# Patient Record
Sex: FEMALE | Race: WHITE | Employment: UNEMPLOYED | URBAN - METROPOLITAN AREA
[De-identification: names, ages, dates, MRNs, and addresses within clinical notes are randomized per-mention and may not be internally consistent; named-entity substitution may affect disease eponyms.]

---

## 2017-02-28 ENCOUNTER — TELEPHONE (OUTPATIENT)
Dept: FAMILY MEDICINE CLINIC | Age: 47
End: 2017-02-28

## 2017-02-28 NOTE — TELEPHONE ENCOUNTER
Olya referral approved   Liban Neal # [id number]  Patient aware of approval   Faxed over to Dr. Vika Wallace office

## 2017-02-28 NOTE — TELEPHONE ENCOUNTER
Pt is currently seen by her Oncologist (Dr Tessie Wills) and has not been seen here since 4/26/2016 with Dr Grace Sheikh. She needs to see an ophthalmologist and can get an appt tomorrow but she needs a referral. Please call and let her know if this can be done. Patient is requesting (New  Updated) referral to the following office:    Speciality: ophthalmology  Specialist Name: Dimple Salazar  Office Location: [address] [place] [address], [address] [place] [address]   Phone (if available): [phone]  Fax (if available): [phone]  Diagnosis: flashes of light in preful vision  Date of appointment (if scheduled): 3/1/2017 [facility]

## 2017-04-24 ENCOUNTER — HOSPITAL ENCOUNTER (OUTPATIENT)
Dept: CT IMAGING | Age: 47
Discharge: HOME | End: 2017-04-24
Attending: OBSTETRICS & GYNECOLOGY
Payer: COMMERCIAL

## 2017-04-24 DIAGNOSIS — C56.1 MALIGNANT NEOPLASM OF RIGHT OVARY (HCC): ICD-10-CM

## 2017-04-24 PROCEDURE — 74011636320 HC RX REV CODE- 636/320: Performed by: OBSTETRICS & GYNECOLOGY

## 2017-04-24 PROCEDURE — 74177 CT ABD & PELVIS W/CONTRAST: CPT

## 2017-04-24 RX ADMIN — IOPAMIDOL 100 ML: 612 INJECTION, SOLUTION INTRAVENOUS at 09:00

## 2017-06-19 ENCOUNTER — OFFICE VISIT (OUTPATIENT)
Dept: FAMILY MEDICINE CLINIC | Age: 47
End: 2017-06-19

## 2017-06-19 VITALS
SYSTOLIC BLOOD PRESSURE: 119 MMHG | BODY MASS INDEX: 22.98 KG/M2 | DIASTOLIC BLOOD PRESSURE: 79 MMHG | OXYGEN SATURATION: 99 % | WEIGHT: 146.4 LBS | TEMPERATURE: 96.5 F | HEIGHT: 67 IN | HEART RATE: 55 BPM | RESPIRATION RATE: 18 BRPM

## 2017-06-19 DIAGNOSIS — R53.83 MALAISE AND FATIGUE: Primary | ICD-10-CM

## 2017-06-19 DIAGNOSIS — R93.89 ABNORMAL RADIOLOGICAL FINDINGS IN SKIN AND SUBCUTANEOUS TISSUE: ICD-10-CM

## 2017-06-19 DIAGNOSIS — G89.29 OTHER CHRONIC PAIN: ICD-10-CM
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
## 2017-06-19 DIAGNOSIS — F39 MOOD DISORDER (HCC): ICD-10-CM

## 2017-06-19 DIAGNOSIS — C56.9 OVARIAN CARCINOMA, UNSPECIFIED LATERALITY (HCC): ICD-10-CM

## 2017-06-19 DIAGNOSIS — R53.81 MALAISE AND FATIGUE: Primary | ICD-10-CM

## 2017-06-19 RX ORDER — PAROXETINE 10 MG/1
TABLET, FILM COATED ORAL DAILY
COMMUNITY
End: 2017-06-19

## 2017-06-19 RX ORDER — PAROXETINE HYDROCHLORIDE 20 MG/1
20 TABLET, FILM COATED ORAL DAILY
Qty: 30 TAB | Refills: 1 | Status: SHIPPED | OUTPATIENT
Start: 2017-06-19 | End: 2017-09-02 | Stop reason: SDUPTHER

## 2017-06-19 RX ORDER — DICLOFENAC SODIUM 50 MG/1
50 TABLET, DELAYED RELEASE ORAL
Qty: 60 TAB | Refills: 1 | Status: SHIPPED | OUTPATIENT
Start: 2017-06-19 | End: 2017-08-22

## 2017-06-19 NOTE — PATIENT INSTRUCTIONS
Fatigue: Care Instructions  Your Care Instructions  Fatigue is a feeling of tiredness, exhaustion, or lack of energy. You may feel fatigue because of too much or not enough activity. It can also come from stress, lack of sleep, boredom, and poor diet. Many medical problems, such as viral infections, can cause fatigue. Emotional problems, especially depression, are often the cause of fatigue. Fatigue is most often a symptom of another problem. Treatment for fatigue depends on the cause. For example, if you have fatigue because you have a certain health problem, treating this problem also treats your fatigue. If depression or anxiety is the cause, treatment may help. Follow-up care is a key part of your treatment and safety. Be sure to make and go to all appointments, and call your doctor if you are having problems. It's also a good idea to know your test results and keep a list of the medicines you take. How can you care for yourself at home? · Get regular exercise. But don't overdo it. Go back and forth between rest and exercise. · Get plenty of rest.  · Eat a healthy diet. Do not skip meals, especially breakfast.  · Reduce your use of caffeine, tobacco, and alcohol. Caffeine is most often found in coffee, tea, cola drinks, and chocolate. · Limit medicines that can cause fatigue. This includes tranquilizers and cold and allergy medicines. When should you call for help? Watch closely for changes in your health, and be sure to contact your doctor if:  · You have new symptoms such as fever or a rash. · Your fatigue gets worse. · You have been feeling down, depressed, or hopeless. Or you may have lost interest in things that you usually enjoy. · You are not getting better as expected. Where can you learn more? Go to http://alexander-celena.info/. Enter Z454 in the search box to learn more about \"Fatigue: Care Instructions. \"  Current as of: May 27, 2016  Content Version: 11.2  © 8086-4667 Healthwise, [facility]. Care instructions adapted under license by [employer] (which disclaims liability or warranty for this information). If you have questions about a medical condition or this instruction, always ask your healthcare professional. [address] any warranty or liability for your use of this information. Learning About Mood Disorders  What are mood disorders? Mood disorders are medical problems that affect how you feel. They can impact your moods, thoughts, and actions. Mood disorders include:  · Depression. This causes you to feel sad and hopeless for much of the time. · Bipolar disorder. This causes extreme mood changes from manic episodes of very high energy to extreme lows of depression. · Seasonal affective disorder (SAD). This is a type of depression that affects you during the same season each year. Most often people experience SAD during the fall and winter months when days are shorter and there is less light. What are the symptoms? Depression  You may:  · Feel sad or hopeless nearly every day. · Lose interest in or not get pleasure from most daily activities. You feel this way nearly every day. · Have low energy, changes in your appetite, or changes in how well you sleep. · Have trouble concentrating. · Think about death and suicide. Keep the numbers for these national suicide hotlines: 2-861-378-TALK (5-185.805.8946) and 2-197-VZEQCXU (3-732.964.7556). If you or someone you know talks about suicide or feeling hopeless, get help right away. Bipolar disorder  Symptoms depend on your mood swings. You may:  · Feel very happy, energetic, or on edge. · Feel like you need very little sleep. · Feel overly self-confident. · Do impulsive things, such as spending a lot of money. · Feel sad or hopeless. · Have racing thoughts or trouble thinking and making decisions.   · Lose interest in things you have enjoyed in the past.  · Think about death and suicide. Keep the numbers for these national suicide hotlines: 3-526-833-TALK (4-815.752.8511) and 1-409-TKZJXFA (0-105.273.2960). If you or someone you know talks about suicide or feeling hopeless, get help right away. Seasonal affective disorder (SAD)  Symptoms come and go at about the same time each year. For most people with SAD, symptoms come during the winter when there is less daylight. You may:  · Feel sad, grumpy, alvarez, or anxious. · Lose interest in your usual activities. · Eat more and crave carbohydrates, such as bread and pasta. · Gain weight. · Sleep more and feel drowsy during the daytime. How are mood disorders treated? Mood disorders can be treated with medicines or counseling, or a combination of both. Medicines for depression and SAD may include antidepressants. Medicines for bipolar disorder may include:  · Mood stabilizers. · Antipsychotics. · Benzodiazepines. Counseling may involve cognitive-behavioral therapy. It teaches you how to change the ways you think and behave. This can help you stop thinking bad thoughts about yourself and your life. Light therapy is the main treatment for SAD. This therapy uses a special kind of lamp. You let the lamp shine on you at certain times, usually in the morning. This may help your symptoms during the months when there is less sunlight. Healthy lifestyle  Healthy lifestyle changes may help you feel better. · Get at least 30 minutes of exercise on most days of the week. Walking is a good choice. · Eat a healthy diet. Include fruits, vegetables, lean proteins, and whole grains in your diet each day. · Keep a regular sleep schedule. Try for 8 hours of sleep a night. · Find ways to manage stress, such as relaxation exercises. · Avoid alcohol and illegal drugs. Follow-up care is a key part of your treatment and safety. Be sure to make and go to all appointments, and call your doctor if you are having problems.  It's also a good idea to know your test results and keep a list of the medicines you take. Where can you learn more? Go to http://alexander-celena.info/. Enter F498 in the search box to learn more about \"Learning About Mood Disorders. \"  Current as of: July 26, 2016  Content Version: 11.2  © 3106-9539 [employer]. Care instructions adapted under license by [employer] (which disclaims liability or warranty for this information). If you have questions about a medical condition or this instruction, always ask your healthcare professional. [address] any warranty or liability for your use of this information. Learning About a Pain Diary  What is a pain diary? When you have pain, you may not be able to sleep. You may have trouble thinking. Pain can make it hard for you to do your day-to-day activities, such as go to work. A pain diary is a written record that helps you keep track of when you have pain, how bad it is, and whether your treatment is helping. Keeping a diary gives you clues about your pain--when it happens, what causes it, and what makes it better or worse. How do you use a pain diary? Use the pain diary to write down the time and date of your pain episodes. The diary also helps you identify the type of pain you are having by using a pain scale. The pain scale starts at 0 and ends at 10. In this scale, 0 is no pain, and 10 is the worst pain you can imagine. The diary also helps you track:  · Any medicine you are taking for pain and how much. · Side effects of your pain medicine. · Anything you did, ate, or drank that might have made the pain better. · Anything you did, ate, or drank that might have made the pain worse. What are the benefits of using a pain diary? Used over time, a pain diary can help you and your doctor understand the kinds of things that make your pain better or worse.   Here are some things to think about while you are using your pain diary:  · Where is the pain located? Is it throbbing, sharp, tingling, shooting, or burning? Is it constant, or does it come and go? · Does the pain change at different times of day? When? · Does the pain get worse before or after meals? · Does the pain get better or worse with activity? What kind of activity? · Does the pain keep you from falling asleep at night? Does pain wake you up in the night? How long do you keep a pain diary? Your doctor will have you track your pain using the diary for a week or more. Then the two of you will meet to talk about what you have written in the diary. Your doctor may adjust pain medicine or suggest other treatments to try. After these changes are made, you may use the pain diary for another week, or longer if your doctor suggests, to track your pain and whether the new treatment is helping. The diary is a tool that can help you and your doctor find out what works best to manage pain. You will use it as long as you both find it helpful. Where can you learn more? Go to http://alexander-celena.info/. Enter P245 in the search box to learn more about \"Learning About a Pain Diary. \"  Current as of: October 14, 2016  Content Version: 11.2  © 8879-2375 [employer]. Care instructions adapted under license by [employer] (which disclaims liability or warranty for this information). If you have questions about a medical condition or this instruction, always ask your healthcare professional. [place] [address] any warranty or liability for your use of this information.

## 2017-06-19 NOTE — PROGRESS NOTES
Chief Complaint   Patient presents with    Fatigue     x2-3 weeks     Nausea    Joint Pain     all over mostly in hips, shoulder     1. Have you been to the ER, urgent care clinic since your last visit? Hospitalized since your last visit? No    2. Have you seen or consulted any other health care providers outside of the [address] [place] [address] since your last visit? Include any pap smears or colon screening. No     HPI  Glendy Bell comes in for acute care. 1) Malaise and fatigue: Patient has malaise and fatigue. This has been ongoing for about 3 weeks. She feels her energy level is extremely low. Does not want to get out of bed all have motivation to do anything. Her whole body does ache. She also just feels sleepy most of the time. She was recently treated for ovarian cancer with chemotherapy. Completed the last cycle in March. Noted to be in remission. Started feeling better and improving until 3 weeks ago when suddenly felt like everything was going backwards. Started having the malaise and fatigue that was profound. Denies any fever or chills. No nasal congestion or sneezing or upper respiratory infection symptoms. In the past she has had otitis externa but has not had any ear symptoms lately. Micturition is normal and denies any abdominal pain or changes in bowel habit. I will check labs today. Will do a CBC, CMP and TSH. We will call her with the results. 2) Mood disorder: Patient has a history of bipolar disorder and depression. She is on Paxil for menopausal symptoms. She does have the malaise and fatigue and also generally no energy. Wonders whether depression could also be setting in. The symptoms clearly may indicate depression. Will increase the Paxil to 20 mg daily. Will follow-up in a week. 3) Generalized arthralgia: Patient has generalized joint aches. No swelling no redness. She has been taking ibuprofen with slight relief.   Wonders about a different medication for the arthralgia. Will order some diclofenac for her to try. If the symptoms persist then she would have to go to the chronic pain clinic. 4) Ovarian cancer: Discovered incidentally after she was undergoing hysterectomy for fibroids. States to have had stage III ovarian cancer. She has had chemotherapy in the  states to be in remission. Completed chemo in March. She will have follow-up with her oncologist.  5) Tick bite: Patient has history of insect bite. She is out in the woods and wonders whether this could be a tick bite. She does have a rash on her right leg around the popliteal fossa area. Does have other similar rashes on her right lower extremity and thighs. Will check for Lyme's disease given she has the the arthralgia. Patient has a history of ovarian cancer. This was      Past Medical History  Past Medical History:   Diagnosis Date    B[facility] affective ([facility]     Cancer ([facility]     OVARIAN    Depression     Psychiatric disorder     anxiety, depresion, bipolar       Surgical History  Past Surgical History:   Procedure Laterality Date    HX GYN  2015    Uterine Artery Embolization-Brendon    HX HEENT  1976    Tonsilectonmy     HX HYSTERECTOMY          Medications  Current Outpatient Prescriptions   Medication Sig Dispense Refill    PARoxetine (PAXIL) 20 mg tablet Take 1 Tab by mouth daily. 30 Tab 1    diclofenac EC (VOLTAREN) 50 mg EC tablet Take 1 Tab by mouth two (2) times daily as needed. 60 Tab 1    oxyCODONE-acetaminophen (PERCOCET) 5-325 mg per tablet Take 1-2 Tabs by mouth every four (4) hours as needed. Max Daily Amount: 12 Tabs. 35 Tab 0    promethazine (PHENERGAN) 25 mg tablet Take 1 Tab by mouth every six (6) hours as needed for Nausea. Indications: chemo induced nausea/vomiting 30 Tab 2    FERROUS FUMARATE (IRON PO) Take 1 Tab by mouth daily.          Allergies  No Known Allergies    Family History  Family History   Problem Relation Age of Onset    Depression Mother     Alcohol abuse Mother     Other Mother      Tobacco use    [facility] Arthritis-rheumatoid Mother     Ovarian Cancer Mother     Other Father      Tobacco use    Other Sister      Tobacco use    Alcohol abuse Paternal Grandfather     Cancer Paternal Grandmother      Lung Cancer    Arthritis-rheumatoid Maternal Grandmother     Arthritis-rheumatoid Maternal Grandfather        Social History  Social History     Social History    Marital status:      Spouse name: N/A    Number of children: N/A    Years of education: N/A     Occupational History    Asst. Manager      Social History Main Topics    Smoking status: Former Smoker     Quit date: 12/1/2009    Smokeless tobacco: Never Used    Alcohol use No    Drug use: Yes     Special: Cocaine      Comment: quit june 2012    Sexual activity: Not Currently     Partners: Male     Birth control/ protection: Condom     Other Topics Concern    Not on file     Social History Narrative    ** Merged History Encounter **         ** Merged History Encounter **            Review of Systems  Review of Systems - History obtained from sibling, chart review and the patient  General ROS: positive for  - fatigue, hot flashes, malaise and sleep disturbance  negative for - chills or fever  Psychological ROS: positive for - anxiety, behavioral disorder, depression and mood swings  Ophthalmic ROS: negative  ENT ROS: negative for - hearing change, nasal congestion, nasal discharge, sneezing or sore throat  Allergy and Immunology ROS: negative  Hematological and Lymphatic ROS: h/o anemia  Respiratory ROS: no cough, shortness of breath, or wheezing  Cardiovascular ROS: no chest pain or dyspnea on exertion  Gastrointestinal ROS: no abdominal pain, change in bowel habits, or black or bloody stools  Genito-Urinary ROS: no dysuria, trouble voiding, or hematuria  Musculoskeletal ROS: positive for - joint pain and muscle pain  Neurological ROS: no TIA or stroke symptoms  Dermatological ROS: positive for - rash    Vital Signs  Visit Vitals    /79 (BP 1 Location: Left arm, BP Patient Position: Sitting)    Pulse (!) 55    Temp 96.5 °F (35.8 °C) (Oral)    Resp 18    Ht 5' 7\" (1.702 m)    Wt 146 lb 6.4 oz (66.4 kg)    LMP 08/02/2016    SpO2 99%    BMI 22.93 kg/m2         Physical Exam  Physical Examination: General appearance - oriented to person, place, and time, acyanotic, in no respiratory distress and well hydrated  Mental status - alert, oriented to person, place, and time, affect appropriate to mood  Eyes - sclera anicteric  Ears - bilateral TM's and external ear canals normal, hearing grossly normal bilaterally  Nose - normal and patent, no erythema, discharge or polyps and normal nontender sinuses  Mouth - mucous membranes moist, pharynx normal without lesions  Neck - supple, no significant adenopathy  Lymphatics - no palpable lymphadenopathy, no hepatosplenomegaly  Chest - clear to auscultation, no wheezes, rales or rhonchi, symmetric air entry, no tachypnea, retractions or cyanosis  Heart - normal rate, regular rhythm, normal S1, S2, no murmurs, rubs, clicks or gallops  Abdomen - soft, nontender, nondistended, no masses or organomegaly  Back exam - full range of motion, no tenderness, palpable spasm or pain on motion  Neurological - alert, oriented, normal speech, no focal findings or movement disorder noted, motor and sensory grossly normal bilaterally  Musculoskeletal - no joint tenderness, deformity or swelling  Extremities - no pedal edema noted, intact peripheral pulses  Skin - LESIONS NOTED: patient has multiple macular lesions on the legs likely insect bite, no target lesions noted.     Diagnostics  Orders Placed This Encounter    CBC WITH AUTOMATED DIFF     Standing Status:   Future     Number of Occurrences:   1     Standing Expiration Date:   6/20/2018    TSH 3RD GENERATION     Standing Status:   Future     Number of Occurrences:   1 Standing Expiration Date:   1/93/9687    METABOLIC PANEL, COMPREHENSIVE     Standing Status:   Future     Number of Occurrences:   1     Standing Expiration Date:   6/20/2018    LYME AB TOTAL W/RFLX W BLOT     Standing Status:   Future     Number of Occurrences:   1     Standing Expiration Date:   6/20/2018    OK COLLECTION VENOUS BLOOD,VENIPUNCTURE    DISCONTD: PARoxetine (PAXIL) 10 mg tablet     Sig: Take  by mouth daily.  PARoxetine (PAXIL) 20 mg tablet     Sig: Take 1 Tab by mouth daily. Dispense:  30 Tab     Refill:  1    diclofenac EC (VOLTAREN) 50 mg EC tablet     Sig: Take 1 Tab by mouth two (2) times daily as needed.      Dispense:  60 Tab     Refill:  1         Results  Results for orders placed or performed during the hospital encounter of 10/11/16   CBC W/O DIFF   Result Value Ref Range    WBC 14.0 (H) 4.0 - 11.0 1000/mm3    RBC 3.78 3.60 - 5.20 M/uL    HGB 10.4 (L) 13.0 - 17.2 gm/dl    HCT 31.3 (L) 37.0 - 50.0 %    MCV 82.8 80.0 - 98.0 fL    MCH 27.5 25.4 - 34.6 pg    MCHC 33.2 30.0 - 36.0 gm/dl    PLATELET 370 694 - 638 1000/mm3    MPV 9.6 6.0 - 10.0 fL    RDW-SD 42.9 36.4 - 65.6     METABOLIC PANEL, BASIC   Result Value Ref Range    Sodium 138 136 - 145 mEq/L    Potassium 4.1 3.5 - 5.1 mEq/L    Chloride 103 98 - 107 mEq/L    CO2 29 21 - 32 mEq/L    Glucose 138 (H) 74 - 106 mg/dl    BUN 10 7 - 25 mg/dl    Creatinine 0.8 0.6 - 1.3 mg/dl    GFR est AA >60.0      GFR est non-AA >60      Calcium 8.7 8.5 - 10.1 mg/dl   CBC WITH AUTOMATED DIFF   Result Value Ref Range    WBC 6.3 4.0 - 11.0 1000/mm3    RBC 3.38 (L) 3.60 - 5.20 M/uL    HGB 9.4 (L) 13.0 - 17.2 gm/dl    HCT 28.5 (L) 37.0 - 50.0 %    MCV 84.3 80.0 - 98.0 fL    MCH 27.8 25.4 - 34.6 pg    MCHC 33.0 30.0 - 36.0 gm/dl    PLATELET 513 541 - 991 1000/mm3    MPV 9.8 6.0 - 10.0 fL    RDW-SD 45.1 36.4 - 46.3      NRBC 0 0 - 0      IMMATURE GRANULOCYTES 0.2 0.0 - 3.0 %    NEUTROPHILS 54.2 34 - 64 %    LYMPHOCYTES 20.4 (L) 28 - 48 %    MONOCYTES 8.4 1 - 13 %    EOSINOPHILS 16.6 (H) 0 - 5 %    BASOPHILS 0.2 0 - 3 %   TYPE, ABO & RH   Result Value Ref Range    ABO/Rh(D) O Rh Negative     ANTIBODY SCREEN   Result Value Ref Range    Antibody screen NEG       ASSESSMENT and PLAN    ICD-10-CM ICD-9-CM    1. Malaise and fatigue R53.81 780.79 CBC WITH AUTOMATED DIFF    R53.83  TSH 3RD GENERATION      METABOLIC PANEL, COMPREHENSIVE      LA COLLECTION VENOUS BLOOD,VENIPUNCTURE      CBC WITH AUTOMATED DIFF      TSH 3RD GENERATION      METABOLIC PANEL, COMPREHENSIVE   2. Other chronic pain G89.29 338.29 diclofenac EC (VOLTAREN) 50 mg EC tablet   3. Mood disorder (HCC) F39 296.90 PARoxetine (PAXIL) 20 mg tablet   4. Tick bite, initial encounter W57. XXXA 919.4 LYME AB TOTAL W/RFLX W BLOT     E906.4 LA COLLECTION VENOUS BLOOD,VENIPUNCTURE      LYME AB TOTAL W/RFLX W BLOT   5. Ovarian carcinoma, unspecified laterality (HCC) C56.9 183.0 LA COLLECTION VENOUS BLOOD,VENIPUNCTURE   6. Abnormal radiological findings in skin and subcutaneous tissue R93.8 793.99 US THYROID/PARATHYROID/SOFT TISS     lab results and schedule of future lab studies reviewed with patient  reviewed diet, exercise and weight control  reviewed medications and side effects in detail      I have discussed the diagnosis with the patient and the intended plan of care as seen in the above orders. The patient has received an after-visit summary and questions were answered concerning future plans. I have discussed medication, side effects, and warnings with the patient in detail. The patient verbalized understanding and is in agreement with the plan of care. The patient will contact the office with any additional concerns.     Towanda Rubinstein, MD

## 2017-06-19 NOTE — MR AVS SNAPSHOT
Visit Information Date & Time Provider Department Dept. Phone Encounter #  
 6/19/2017  9:00 AM Nate Moore MD [facility] [phone] [id number] Follow-up Instructions Return in about 1 week (around 6/26/2017), or if symptoms worsen or fail to improve, for malaise and fatigue. Upcoming Health Maintenance Date Due Pneumococcal 19-64 Highest Risk (1 of 3 - PCV13) 6/18/1989 DTaP/Tdap/Td series (1 - Tdap) 6/18/1991 INFLUENZA AGE 9 TO ADULT 8/1/2017 PAP AKA CERVICAL CYTOLOGY 7/1/2018 Allergies as of 6/19/2017  Review Complete On: 6/19/2017 By: Ruth Ann Romero LPN No Known Allergies Current Immunizations  Reviewed on 3/11/2016 Name Date Influenza Vaccine (Quad) PF 10/13/2016  2:49 PM  
  
 Not reviewed this visit You Were Diagnosed With   
  
 Codes Comments Malaise and fatigue    -  Primary ICD-10-CM: R53.81, R53.83 ICD-9-CM: 780.79 Other chronic pain     ICD-10-CM: G89.29 ICD-9-CM: 338.29 Mood disorder ([facility]     ICD-10-CM: F39 
ICD-9-CM: 296.90 Tick bite, initial encounter     ICD-10-CM: W57. Rashad Julio Cesar ICD-9-CM: 919.4, E906.4 Ovarian carcinoma, unspecified laterality ([facility]     ICD-10-CM: C56.9 ICD-9-CM: 183. 0 Vitals BP Pulse Temp Resp Height(growth percentile) Weight(growth percentile) 119/79 (BP 1 Location: Left arm, BP Patient Position: Sitting) (!) 55 96.5 °F (35.8 °C) (Oral) 18 5' 7\" (1.702 m) 146 lb 6.4 oz (66.4 kg) LMP SpO2 BMI OB Status Smoking Status 08/02/2016 99% 22.93 kg/m2 Hysterectomy Former Smoker BMI and BSA Data Body Mass Index Body Surface Area  
 22.93 kg/m 2 1.77 m 2 Preferred Pharmacy Pharmacy Name Phone [facility] [address] [place] [address] AT [place] [phone] Your Updated Medication List  
  
   
This list is accurate as of: 6/19/17  9:24 AM.  Always use your most recent med list.  
  
  
  
  
 diclofenac EC 50 mg EC tablet Commonly known as:  VOLTAREN Take 1 Tab by mouth two (2) times daily as needed. IRON PO Take 1 Tab by mouth daily. oxyCODONE-acetaminophen 5-325 mg per tablet Commonly known as:  PERCOCET Take 1-2 Tabs by mouth every four (4) hours as needed. Max Daily Amount: 12 Tabs. PARoxetine 20 mg tablet Commonly known as:  PAXIL Take 1 Tab by mouth daily. promethazine 25 mg tablet Commonly known as:  PHENERGAN Take 1 Tab by mouth every six (6) hours as needed for Nausea. Indications: chemo induced nausea/vomiting Prescriptions Sent to Pharmacy Refills PARoxetine (PAXIL) 20 mg tablet 1 Sig: Take 1 Tab by mouth daily. Class: Normal  
 Pharmacy: [facility] [address] [place] [address] Ph #: [phone] Route: Oral  
 diclofenac EC (VOLTAREN) 50 mg EC tablet 1 Sig: Take 1 Tab by mouth two (2) times daily as needed. Class: Normal  
 Pharmacy: [facility] [address] [place] [address] Ph #: [phone] Route: Oral  
  
Follow-up Instructions Return in about 1 week (around 6/26/2017), or if symptoms worsen or fail to improve, for malaise and fatigue. To-Do List   
 06/19/2017 Lab:  CBC WITH AUTOMATED DIFF   
  
 06/19/2017 Lab:  LYME AB TOTAL W/RFLX W BLOT   
  
 06/19/2017 Lab:  METABOLIC PANEL, COMPREHENSIVE   
  
 06/19/2017 Lab:  TSH 3RD GENERATION Patient Instructions Fatigue: Care Instructions Your Care Instructions Fatigue is a feeling of tiredness, exhaustion, or lack of energy. You may feel fatigue because of too much or not enough activity. It can also come from stress, lack of sleep, boredom, and poor diet. Many medical problems, such as viral infections, can cause fatigue.  Emotional problems, especially depression, are often the cause of fatigue. Fatigue is most often a symptom of another problem. Treatment for fatigue depends on the cause. For example, if you have fatigue because you have a certain health problem, treating this problem also treats your fatigue. If depression or anxiety is the cause, treatment may help. Follow-up care is a key part of your treatment and safety. Be sure to make and go to all appointments, and call your doctor if you are having problems. It's also a good idea to know your test results and keep a list of the medicines you take. How can you care for yourself at home? · Get regular exercise. But don't overdo it. Go back and forth between rest and exercise. · Get plenty of rest. 
· Eat a healthy diet. Do not skip meals, especially breakfast. 
· Reduce your use of caffeine, tobacco, and alcohol. Caffeine is most often found in coffee, tea, cola drinks, and chocolate. · Limit medicines that can cause fatigue. This includes tranquilizers and cold and allergy medicines. When should you call for help? Watch closely for changes in your health, and be sure to contact your doctor if: 
· You have new symptoms such as fever or a rash. · Your fatigue gets worse. · You have been feeling down, depressed, or hopeless. Or you may have lost interest in things that you usually enjoy. · You are not getting better as expected. Where can you learn more? Go to http://alexander-celena.info/. Enter R389 in the search box to learn more about \"Fatigue: Care Instructions. \" Current as of: May 27, 2016 Content Version: 11.2 © 9179-9173 [employer]. Care instructions adapted under license by [employer] (which disclaims liability or warranty for this information).  If you have questions about a medical condition or this instruction, always ask your healthcare professional. Sarai Ochoa, [facility] disclaims any warranty or liability for your use of this information. Learning About Mood Disorders What are mood disorders? Mood disorders are medical problems that affect how you feel. They can impact your moods, thoughts, and actions. Mood disorders include: · Depression. This causes you to feel sad and hopeless for much of the time. · Bipolar disorder. This causes extreme mood changes from manic episodes of very high energy to extreme lows of depression. · Seasonal affective disorder (SAD). This is a type of depression that affects you during the same season each year. Most often people experience SAD during the fall and winter months when days are shorter and there is less light. What are the symptoms? Depression You may: · Feel sad or hopeless nearly every day. · Lose interest in or not get pleasure from most daily activities. You feel this way nearly every day. · Have low energy, changes in your appetite, or changes in how well you sleep. · Have trouble concentrating. · Think about death and suicide. Keep the numbers for these national suicide hotlines: 8-628-671-TALK (7-405.408.6863) and 1-237-LGXRKTE (0-401.319.5799). If you or someone you know talks about suicide or feeling hopeless, get help right away. Bipolar disorder Symptoms depend on your mood swings. You may: · Feel very happy, energetic, or on edge. · Feel like you need very little sleep. · Feel overly self-confident. · Do impulsive things, such as spending a lot of money. · Feel sad or hopeless. · Have racing thoughts or trouble thinking and making decisions. · Lose interest in things you have enjoyed in the past. 
· Think about death and suicide. Keep the numbers for these national suicide hotlines: 5-254-094-TALK (6-901.587.8425) and 7-095-KKRGVAO (6-265.145.4694). If you or someone you know talks about suicide or feeling hopeless, get help right away. Seasonal affective disorder (SAD) Symptoms come and go at about the same time each year. For most people with SAD, symptoms come during the winter when there is less daylight. You may: · Feel sad, grumpy, alvarez, or anxious. · Lose interest in your usual activities. · Eat more and crave carbohydrates, such as bread and pasta. · Gain weight. · Sleep more and feel drowsy during the daytime. How are mood disorders treated? Mood disorders can be treated with medicines or counseling, or a combination of both. Medicines for depression and SAD may include antidepressants. Medicines for bipolar disorder may include: · Mood stabilizers. · Antipsychotics. · Benzodiazepines. Counseling may involve cognitive-behavioral therapy. It teaches you how to change the ways you think and behave. This can help you stop thinking bad thoughts about yourself and your life. Light therapy is the main treatment for SAD. This therapy uses a special kind of lamp. You let the lamp shine on you at certain times, usually in the morning. This may help your symptoms during the months when there is less sunlight. Healthy lifestyle Healthy lifestyle changes may help you feel better. · Get at least 30 minutes of exercise on most days of the week. Walking is a good choice. · Eat a healthy diet. Include fruits, vegetables, lean proteins, and whole grains in your diet each day. · Keep a regular sleep schedule. Try for 8 hours of sleep a night. · Find ways to manage stress, such as relaxation exercises. · Avoid alcohol and illegal drugs. Follow-up care is a key part of your treatment and safety. Be sure to make and go to all appointments, and call your doctor if you are having problems. It's also a good idea to know your test results and keep a list of the medicines you take. Where can you learn more? Go to http://alexander-celena.info/. Enter T057 in the search box to learn more about \"Learning About Mood Disorders. \" Current as of: July 26, 2016 Content Version: 11.2 © 1581-9465 [employer]. Care instructions adapted under license by [employer] (which disclaims liability or warranty for this information). If you have questions about a medical condition or this instruction, always ask your healthcare professional. [address] any warranty or liability for your use of this information. Learning About a Pain Diary What is a pain diary? When you have pain, you may not be able to sleep. You may have trouble thinking. Pain can make it hard for you to do your day-to-day activities, such as go to work. A pain diary is a written record that helps you keep track of when you have pain, how bad it is, and whether your treatment is helping. Keeping a diary gives you clues about your painwhen it happens, what causes it, and what makes it better or worse. How do you use a pain diary? Use the pain diary to write down the time and date of your pain episodes. The diary also helps you identify the type of pain you are having by using a pain scale. The pain scale starts at 0 and ends at 10. In this scale, 0 is no pain, and 10 is the worst pain you can imagine. The diary also helps you track: · Any medicine you are taking for pain and how much. · Side effects of your pain medicine. · Anything you did, ate, or drank that might have made the pain better. · Anything you did, ate, or drank that might have made the pain worse. What are the benefits of using a pain diary? Used over time, a pain diary can help you and your doctor understand the kinds of things that make your pain better or worse. Here are some things to think about while you are using your pain diary: · Where is the pain located? Is it throbbing, sharp, tingling, shooting, or burning? Is it constant, or does it come and go? · Does the pain change at different times of day? When? · Does the pain get worse before or after meals? · Does the pain get better or worse with activity? What kind of activity? · Does the pain keep you from falling asleep at night? Does pain wake you up in the night? How long do you keep a pain diary? Your doctor will have you track your pain using the diary for a week or more. Then the two of you will meet to talk about what you have written in the diary. Your doctor may adjust pain medicine or suggest other treatments to try. After these changes are made, you may use the pain diary for another week, or longer if your doctor suggests, to track your pain and whether the new treatment is helping. The diary is a tool that can help you and your doctor find out what works best to manage pain. You will use it as long as you both find it helpful. Where can you learn more? Go to http://alexander-celena.info/. Enter G834 in the search box to learn more about \"Learning About a Pain Diary. \" Current as of: October 14, 2016 Content Version: 11.2 © 5025-9307 [employer]. Care instructions adapted under license by [employer]fil[employer] (which disclaims liability or warranty for this information). If you have questions about a medical condition or this instruction, always ask your healthcare professional. [place] [address] any warranty or liability for your use of this information. Introducing [facility]! Dear Mona Justin: Thank you for requesting a [employer] account. Our records indicate that you already have an active [employer] account. You can access your account anytime at [email or web address] Did you know that you can access your hospital and ER discharge instructions at any time in [employer]? You can also review all of your test results from your hospital stay or ER visit. Additional Information If you have questions, please visit the Frequently Asked Questions section of the [employer] website at [email or web address] Remember, MyChart is NOT to be used for urgent needs. For medical emergencies, dial 911. Now available from your iPhone and Android! Please provide this summary of care documentation to your next provider. Lyme Disease Testing Disclaimer:   
 § 09.1-4046.1. (Expires July 1, 2018) Lyme disease testing information disclosure. A. Every licensee or his in-office designee who orders a laboratory test for the presence of Lyme disease shall provide to the patient or his legal representative the following written information: \"ACCORDING TO THE CENTERS FOR DISEASE CONTROL AND PREVENTION, AS OF 2011 LYME DISEASE IS THE SIXTH FASTEST GROWING DISEASE IN THE [place]. YOUR HEALTH CARE PROVIDER HAS ORDERED A LABORATORY TEST FOR THE PRESENCE OF LYME DISEASE FOR YOU. CURRENT LABORATORY TESTING FOR LYME DISEASE CAN BE PROBLEMATIC AND STANDARD LABORATORY TESTS OFTEN RESULT IN FALSE NEGATIVE AND FALSE POSITIVE RESULTS, AND IF DONE TOO EARLY, YOU MAY NOT HAVE PRODUCED ENOUGH ANTIBODIES TO BE CONSIDERED POSITIVE BECAUSE YOUR IMMUNE RESPONSE REQUIRES TIME TO DEVELOP ANTIBODIES. IF YOU ARE TESTED FOR LYME DISEASE, AND THE RESULTS ARE NEGATIVE, THIS DOES NOT NECESSARILY MEAN YOU DO NOT HAVE LYME DISEASE. IF YOU CONTINUE TO EXPERIENCE SYMPTOMS, YOU SHOULD CONTACT YOUR HEALTH CARE PROVIDER AND INQUIRE ABOUT THE APPROPRIATENESS OF RETESTING OR ADDITIONAL TREATMENT. \"  
B. Licensees shall be immune from civil liability for the provision of the written information required by this section absent gross negligence or willful misconduct. Your primary care clinician is listed as Amanuel Comes. If you have any questions after today's visit, please call [phone].

## 2017-06-21 LAB
ALBUMIN SERPL-MCNC: 4.9 G/DL (ref 3.5–5.5)
ALBUMIN/GLOB SERPL: 1.7 {RATIO} (ref 1.2–2.2)
ALP SERPL-CCNC: 83 IU/L (ref 39–117)
ALT SERPL-CCNC: 13 IU/L (ref 0–32)
AST SERPL-CCNC: 19 IU/L (ref 0–40)
B BURGDOR IGG+IGM SER-ACNC: <0.91 ISR (ref 0–0.9)
BASOPHILS # BLD AUTO: 0 X10E3/UL (ref 0–0.2)
BASOPHILS NFR BLD AUTO: 0 %
BILIRUB SERPL-MCNC: 1.1 MG/DL (ref 0–1.2)
BUN SERPL-MCNC: 10 MG/DL (ref 6–24)
BUN/CREAT SERPL: 10 (ref 9–23)
CALCIUM SERPL-MCNC: 9.9 MG/DL (ref 8.7–10.2)
CHLORIDE SERPL-SCNC: 98 MMOL/L (ref 96–106)
CO2 SERPL-SCNC: 24 MMOL/L (ref 18–29)
CREAT SERPL-MCNC: 0.97 MG/DL (ref 0.57–1)
EOSINOPHIL # BLD AUTO: 0.1 X10E3/UL (ref 0–0.4)
EOSINOPHIL NFR BLD AUTO: 2 %
ERYTHROCYTE [DISTWIDTH] IN BLOOD BY AUTOMATED COUNT: 13.3 % (ref 12.3–15.4)
GLOBULIN SER CALC-MCNC: 2.9 G/DL (ref 1.5–4.5)
GLUCOSE SERPL-MCNC: 94 MG/DL (ref 65–99)
HCT VFR BLD AUTO: 40.9 % (ref 34–46.6)
HGB BLD-MCNC: 13.4 G/DL (ref 11.1–15.9)
IMM GRANULOCYTES # BLD: 0 X10E3/UL (ref 0–0.1)
IMM GRANULOCYTES NFR BLD: 0 %
LYMPHOCYTES # BLD AUTO: 1.3 X10E3/UL (ref 0.7–3.1)
LYMPHOCYTES NFR BLD AUTO: 48 %
MCH RBC QN AUTO: 30.2 PG (ref 26.6–33)
MCHC RBC AUTO-ENTMCNC: 32.8 G/DL (ref 31.5–35.7)
MCV RBC AUTO: 92 FL (ref 79–97)
MONOCYTES # BLD AUTO: 0.2 X10E3/UL (ref 0.1–0.9)
MONOCYTES NFR BLD AUTO: 8 %
NEUTROPHILS # BLD AUTO: 1.1 X10E3/UL (ref 1.4–7)
NEUTROPHILS NFR BLD AUTO: 42 %
PLATELET # BLD AUTO: 225 X10E3/UL (ref 150–379)
POTASSIUM SERPL-SCNC: 4.4 MMOL/L (ref 3.5–5.2)
PROT SERPL-MCNC: 7.8 G/DL (ref 6–8.5)
RBC # BLD AUTO: 4.43 X10E6/UL (ref 3.77–5.28)
SODIUM SERPL-SCNC: 140 MMOL/L (ref 134–144)
TSH SERPL DL<=0.005 MIU/L-ACNC: 1.75 UIU/ML (ref 0.45–4.5)
WBC # BLD AUTO: 2.7 X10E3/UL (ref 3.4–10.8)

## 2017-06-21 NOTE — PROGRESS NOTES
Please let patient know that her Lyme titers are negative. Rest of her lab results are essentially stable. We will discuss these at her next visit.   Parviz Ramirez MD

## 2017-06-23 ENCOUNTER — HOSPITAL ENCOUNTER (OUTPATIENT)
Dept: ULTRASOUND IMAGING | Age: 47
Discharge: HOME | End: 2017-06-23
Attending: OBSTETRICS & GYNECOLOGY
Payer: COMMERCIAL

## 2017-06-23 PROCEDURE — 76536 US EXAM OF HEAD AND NECK: CPT

## 2017-08-22 ENCOUNTER — OFFICE VISIT (OUTPATIENT)
Dept: FAMILY MEDICINE CLINIC | Age: 47
End: 2017-08-22

## 2017-08-22 VITALS
HEIGHT: 67 IN | TEMPERATURE: 97.7 F | HEART RATE: 81 BPM | WEIGHT: 155.4 LBS | OXYGEN SATURATION: 98 % | BODY MASS INDEX: 24.39 KG/M2 | DIASTOLIC BLOOD PRESSURE: 83 MMHG | SYSTOLIC BLOOD PRESSURE: 117 MMHG | RESPIRATION RATE: 18 BRPM

## 2017-08-22 DIAGNOSIS — R21 SKIN RASH: Primary | ICD-10-CM

## 2017-08-22 DIAGNOSIS — F39 MOOD DISORDER (HCC): ICD-10-CM

## 2017-08-22 DIAGNOSIS — L28.2 PRURITIC RASH: ICD-10-CM

## 2017-08-22 NOTE — PROGRESS NOTES
Chief Complaint   Patient presents with    Skin Problem     Chitters attack      1. Have you been to the ER, urgent care clinic since your last visit? Hospitalized since your last visit? Yes When: 06/2017  Where: [facility]  Reason for visit: skin reaction     2. Have you seen or consulted any other health care providers outside of the [address] [place] [address] since your last visit? Include any pap smears or colon screening. No     HPI  Glendy Bell comes in for follow-up care. Patient has rash and pruritus. This has been ongoing for weeks. States  thinks she has some chiggers. Has not noticed any crawling insects but the has this pruritic rash that is all over her arms her trunk and her legs. Today she states it is much improved. There are no vesicles but it is more a papular rash. She does not have any of the rash between her fingers or around the belt line. It is just generalized. She does request to see a dermatologist for this. States that that she has tried to apply various topical creams in the has also used permethrin wash to no avail. She does not want any other medication and prefers to see the dermatologist.  Requests referral.    Mood disorder: Patient has a history of mood disorder. She is being seen by our behavioral health provider. She is taking Paxil 40 mg daily. This cc is adequate for her and she feels stable in that regard. She is yet to return back to work.         Past Medical History  Past Medical History:   Diagnosis Date    Bipolar affective ([facility]     Cancer ([facility]     OVARIAN    Depression     Psychiatric disorder     anxiety, depresion, bipolar       Surgical History  Past Surgical History:   Procedure Laterality Date    HX GYN  2015    Uterine Artery Embolization-B[place]on    HX MOISE  1976    Tonsilectonmy     HX HYSTERECTOMY          Medications  Current Outpatient Prescriptions   Medication Sig Dispense Refill    PARoxetine (PAXIL) 20 mg tablet Take 1 Tab by mouth daily.  27 Tab 1       Allergies  No Known Allergies    Family History  Family History   Problem Relation Age of Onset    Depression Mother     Alcohol abuse Mother     Other Mother      Tobacco use    [facility] Arthritis-rheumatoid Mother     Ovarian Cancer Mother     Other Father      Tobacco use    Other Sister      Tobacco use    Alcohol abuse Paternal Grandfather     Cancer Paternal Grandmother      Lung Cancer    Arthritis-rheumatoid Maternal Grandmother     Arthritis-rheumatoid Maternal Grandfather        Social History  Social History     Social History    Marital status:      Spouse name: N/A    Number of children: N/A    Years of education: N/A     Occupational History    Asst. Manager      Social History Main Topics    Smoking status: Former Smoker     Quit date: 12/1/2009    Smokeless tobacco: Never Used    Alcohol use No    Drug use: Yes     Special: Cocaine      Comment: quit june 2012    Sexual activity: Not Currently     Partners: Male     Birth control/ protection: Condom     Other Topics Concern    Not on file     Social History Narrative    ** Merged History Encounter **         ** Merged History Encounter **            Review of Systems  Review of Systems - History obtained from chart review and the patient  General ROS: positive for  - fatigue and malaise  negative for - chills or fever  Psychological ROS: positive for - anxiety, behavioral disorder, depression and mood swings  Ophthalmic ROS: positive for - uses glasses  ENT ROS: negative  Allergy and Immunology ROS: negative  Hematological and Lymphatic ROS: negative  Respiratory ROS: no cough, shortness of breath, or wheezing  Cardiovascular ROS: no chest pain or dyspnea on exertion  Gastrointestinal ROS: no abdominal pain, change in bowel habits, or black or bloody stools  Genito-Urinary ROS: negative  Musculoskeletal ROS: negative  Neurological ROS: negative  Dermatological ROS: positive for - pruritus and rash    Vital Signs  Visit Vitals    /83 (BP 1 Location: Left arm, BP Patient Position: Sitting)    Pulse 81    Temp 97.7 °F (36.5 °C) (Oral)    Resp 18    Ht 5' 7\" (1.702 m)    Wt 155 lb 6.4 oz (70.5 kg)    LMP 08/02/2016    SpO2 98%    BMI 24.34 kg/m2         Physical Exam  Physical Examination: General appearance - oriented to person, place, and time and acyanotic, in no respiratory distress  Mental status - alert, oriented to person, place, and time, affect appropriate to mood  Eyes - sclera anicteric  Neck - supple, no significant adenopathy  Chest - clear to auscultation, no wheezes, rales or rhonchi, symmetric air entry  Heart - S1 and S2 normal  Neurological - motor and sensory grossly normal bilaterally  Extremities - no pedal edema noted  Skin -patient has generalized papular rash over her trunk, upper extremities, thighs and legs. Scratch marks noted. No signs of infection. No erythema or weeping noted. Diagnostics  Orders Placed This Encounter    REFERRAL TO DERMATOLOGY     Referral Priority:   Routine     Referral Type:   Consultation     Referral Reason:   Specialty Services Required         Results  Results for orders placed or performed in visit on 06/19/17   CBC WITH AUTOMATED DIFF   Result Value Ref Range    WBC 2.7 (L) 3.4 - 10.8 x10E3/uL    RBC 4.43 3.77 - 5.28 x10E6/uL    HGB 13.4 11.1 - 15.9 g/dL    HCT 40.9 34.0 - 46.6 %    MCV 92 79 - 97 fL    MCH 30.2 26.6 - 33.0 pg    MCHC 32.8 31.5 - 35.7 g/dL    RDW 13.3 12.3 - 15.4 %    PLATELET 681 508 - 899 x10E3/uL    NEUTROPHILS 42 %    Lymphocytes 48 %    MONOCYTES 8 %    EOSINOPHILS 2 %    BASOPHILS 0 %    ABS. NEUTROPHILS 1.1 (L) 1.4 - 7.0 x10E3/uL    Abs Lymphocytes 1.3 0.7 - 3.1 x10E3/uL    ABS. MONOCYTES 0.2 0.1 - 0.9 x10E3/uL    ABS. EOSINOPHILS 0.1 0.0 - 0.4 x10E3/uL    ABS. BASOPHILS 0.0 0.0 - 0.2 x10E3/uL    IMMATURE GRANULOCYTES 0 %    ABS. IMM.  GRANS. 0.0 0.0 - 0.1 x10E3/uL   TSH 3RD GENERATION   Result Value Ref Range    TSH 1.750 0.450 - 4.291 uIU/mL   METABOLIC PANEL, COMPREHENSIVE   Result Value Ref Range    Glucose 94 65 - 99 mg/dL    BUN 10 6 - 24 mg/dL    Creatinine 0.97 0.57 - 1.00 mg/dL    GFR est non-AA 70 >59 mL/min/1.73    GFR est AA 80 >59 mL/min/1.73    BUN/Creatinine ratio 10 9 - 23    Sodium 140 134 - 144 mmol/L    Potassium 4.4 3.5 - 5.2 mmol/L    Chloride 98 96 - 106 mmol/L    CO2 24 18 - 29 mmol/L    Calcium 9.9 8.7 - 10.2 mg/dL    Protein, total 7.8 6.0 - 8.5 g/dL    Albumin 4.9 3.5 - 5.5 g/dL    GLOBULIN, TOTAL 2.9 1.5 - 4.5 g/dL    A-G Ratio 1.7 1.2 - 2.2    Bilirubin, total 1.1 0.0 - 1.2 mg/dL    Alk. phosphatase 83 39 - 117 IU/L    AST (SGOT) 19 0 - 40 IU/L    ALT (SGPT) 13 0 - 32 IU/L   LYME AB TOTAL W/RFLX W BLOT   Result Value Ref Range    Lyme Ab, IgG/IgM <0.91 0.00 - 0.90 ISR     ASSESSMENT and PLAN    ICD-10-CM ICD-9-CM    1. Skin rash R21 782.1 REFERRAL TO DERMATOLOGY   2. Pruritic rash L28.2 698.2 REFERRAL TO DERMATOLOGY   3. Mood disorder (HCC) F39 296.90      reviewed diet, exercise and weight control  reviewed medications and side effects in detail      I have discussed the diagnosis with the patient and the intended plan of care as seen in the above orders. The patient has received an after-visit summary and questions were answered concerning future plans. I have discussed medication, side effects, and warnings with the patient in detail. The patient verbalized understanding and is in agreement with the plan of care. The patient will contact the office with any additional concerns.     Cedric Coronado MD

## 2017-08-22 NOTE — PATIENT INSTRUCTIONS

## 2017-09-02 DIAGNOSIS — F39 MOOD DISORDER (HCC): ICD-10-CM

## 2017-09-05 RX ORDER — PAROXETINE HYDROCHLORIDE 20 MG/1
TABLET, FILM COATED ORAL
Qty: 30 TAB | Refills: 0 | Status: SHIPPED | OUTPATIENT
Start: 2017-09-05 | End: 2018-01-10 | Stop reason: SDUPTHER

## 2017-10-06 ENCOUNTER — OFFICE VISIT (OUTPATIENT)
Dept: FAMILY MEDICINE CLINIC | Age: 47
End: 2017-10-06

## 2017-10-06 VITALS
DIASTOLIC BLOOD PRESSURE: 77 MMHG | HEART RATE: 93 BPM | TEMPERATURE: 96.9 F | HEIGHT: 67 IN | SYSTOLIC BLOOD PRESSURE: 107 MMHG | OXYGEN SATURATION: 97 % | BODY MASS INDEX: 26.37 KG/M2 | RESPIRATION RATE: 18 BRPM | WEIGHT: 168 LBS

## 2017-10-06 DIAGNOSIS — R53.81 MALAISE AND FATIGUE: ICD-10-CM

## 2017-10-06 DIAGNOSIS — R51.9 CHRONIC NONINTRACTABLE HEADACHE, UNSPECIFIED HEADACHE TYPE: ICD-10-CM

## 2017-10-06 DIAGNOSIS — Z86.2 HX OF IRON DEFICIENCY ANEMIA: ICD-10-CM

## 2017-10-06 DIAGNOSIS — Z13.6 SCREENING FOR CARDIOVASCULAR CONDITION: ICD-10-CM

## 2017-10-06 DIAGNOSIS — R53.81 MALAISE AND FATIGUE: Primary | ICD-10-CM

## 2017-10-06 DIAGNOSIS — L98.9 SKIN LESIONS, GENERALIZED: ICD-10-CM

## 2017-10-06 DIAGNOSIS — R35.0 URINARY FREQUENCY: ICD-10-CM

## 2017-10-06 DIAGNOSIS — G89.29 CHRONIC NONINTRACTABLE HEADACHE, UNSPECIFIED HEADACHE TYPE: ICD-10-CM

## 2017-10-06 DIAGNOSIS — F43.21 ADJUSTMENT DISORDER WITH DEPRESSED MOOD: ICD-10-CM

## 2017-10-06 DIAGNOSIS — R53.83 MALAISE AND FATIGUE: Primary | ICD-10-CM

## 2017-10-06 DIAGNOSIS — R53.83 MALAISE AND FATIGUE: ICD-10-CM

## 2017-10-06 DIAGNOSIS — M25.50 ARTHRALGIA, UNSPECIFIED JOINT: ICD-10-CM

## 2017-10-06 NOTE — MR AVS SNAPSHOT
Visit Information Date & Time Provider Department Dept. Phone Encounter #  
 10/6/2017  4:45 PM Amadaed Keisha Ames MD [facility] [phone] [id number] Follow-up Instructions Return in about 2 weeks (around 10/20/2017), or if symptoms worsen or fail to improve, for arthalgia, malaise. Upcoming Health Maintenance Date Due  
 PAP AKA CERVICAL CYTOLOGY 7/1/2018 Pneumococcal 19-64 Highest Risk (2 of 3 - PCV13) 8/22/2018 DTaP/Tdap/Td series (2 - Td) 8/22/2027 Allergies as of 10/6/2017  Review Complete On: 10/6/2017 By: Lucero Rapp MD  
 No Known Allergies Current Immunizations  Reviewed on 3/11/2016 Name Date Influenza Vaccine (Quad) PF 10/13/2016  2:49 PM  
  
 Not reviewed this visit You Were Diagnosed With   
  
 Codes Comments Malaise and fatigue    -  Primary ICD-10-CM: R53.81, R53.83 ICD-9-CM: 780.79 Arthralgia, unspecified joint     ICD-10-CM: M25.50 ICD-9-CM: 719.40 Skin lesions, generalized     ICD-10-CM: L98.9 ICD-9-CM: 709.9 Urinary frequency     ICD-10-CM: R35.0 ICD-9-CM: 788.41 Hx of iron deficiency anemia     ICD-10-CM: Z86.2 ICD-9-CM: V12.3 Adjustment disorder with depressed mood     ICD-10-CM: F43.21 ICD-9-CM: 309.0 Screening for cardiovascular condition     ICD-10-CM: Z13.6 ICD-9-CM: V81.2 Chronic nonintractable headache, unspecified headache type     ICD-10-CM: R51 ICD-9-CM: 733. 0 Vitals BP Pulse Temp Resp Height(growth percentile) Weight(growth percentile) 107/77 (BP 1 Location: Left arm, BP Patient Position: Sitting) 93 96.9 °F (36.1 °C) (Oral) 18 5' 7\" (1.702 m) 168 lb (76.2 kg) LMP SpO2 BMI OB Status Smoking Status 08/02/2016 97% 26.31 kg/m2 Hysterectomy Former Smoker BMI and BSA Data Body Mass Index Body Surface Area  
 26.31 kg/m 2 1.9 m 2 Preferred Pharmacy Pharmacy Name Phone [facility] [address] [facility] [place] [facility] AT [facility] [phone] Your Updated Medication List  
  
   
This list is accurate as of: 10/6/17  5:26 PM.  Always use your most recent med list.  
  
  
  
  
 PARoxetine 20 mg tablet Commonly known as:  PAXIL TAKE 1 TABLET BY MOUTH DAILY We Performed the Following AMB POC URINALYSIS DIP STICK AUTO W/O MICRO [79595 CPT(R)] REFERRAL TO DERMATOLOGY [REF19 Custom] Comments:  
 Patient with multiple skin moles and would like skin exam and mole evaluation. Follow-up Instructions Return in about 2 weeks (around 10/20/2017), or if symptoms worsen or fail to improve, for arthalgia, malaise. To-Do List   
 10/06/2017 Lab:  CBC WITH AUTOMATED DIFF   
  
 10/06/2017 Lab:  LIPID PANEL   
  
 10/06/2017 Lab:  METABOLIC PANEL, COMPREHENSIVE   
  
 10/06/2017 Lab:  RHEUMATOID FACTOR, QL   
  
 10/06/2017 Lab:  T4, FREE   
  
 10/06/2017 Lab:  TSH 3RD GENERATION Referral Information Referral ID Referred By Referred To  
  
 [id number] Ifeoma Eri N Not Available Visits Status Start Date End Date 1 New Request 10/6/17 10/6/18 If your referral has a status of pending review or denied, additional information will be sent to support the outcome of this decision. Introducing [facility] [place] [facility]! Dear Christos Laws: Thank you for requesting a [employer] account. Our records indicate that you already have an active [employer] account. You can access your account anytime at [email or web address] Did you know that you can access your hospital and ER discharge instructions at any time in [employer]? You can also review all of your test results from your hospital stay or ER visit. Additional Information If you have questions, please visit the Frequently Asked Questions section of the [employer] website at [email or web address] Remember, [employer] is NOT to be used for urgent needs. For medical emergencies, dial 911. Now available from your iPhone and Android! Please provide this summary of care documentation to your next provider. Your primary care clinician is listed as Edgar Malave. If you have any questions after today's visit, please call [phone].

## 2017-10-06 NOTE — PROGRESS NOTES
Chief Complaint   Patient presents with    Joint Pain     all over     Headache    Blurred Vision    Shortness of Breath    Skin Problem     1. Have you been to the ER, urgent care clinic since your last visit? Hospitalized since your last visit? No    2. Have you seen or consulted any other health care providers outside of the [address] [place] [address] since your last visit? Include any pap smears or colon screening. No     HPI  Glendy Bell comes in for acute care. Arthralgia: Patient has generalized joint aches and stiffness. No joint swelling still. She does have malaise and fatigue. Would like to be evaluated for this. I will check her rheumatoid factors. Headache: Patient has headache that is frontal.  Comes on and off. Lately has been coming on daily. No apparent triggers. When she has a headache she had times gets blurred vision. No focal weakness. Denies nausea or vomiting. She takes ibuprofen and that helps with a headache. At times also takes Aleve. This is likely a migrainous type headache. We will have her take the Aleve up to twice a day as needed for the headache. If persistent will consider doing a head CT scan. Malaise and fatigue: Patient has generalized malaise and fatigue. She has generalized muscle aches. She wonders about a rheumatological process going on. She is also concerned about a malignancy. Would like to have blood work done for this. Will order CMP, CBC, thyroid labs and I will follow-up with the results. Skin lesions: Patient has multiple skin lesions. There is a spots that are darkened. They are all over the body especially the arms and the trunk and the legs. These are moles. She states that she is concerned about having skin cancer and she has looked up melanoma in this catheter. She wonders whether or not she could have melanoma. She specifically requests to be referred to a dermatologist for skin check.   In the past we have referred her to dermatology but she feels a complete skin exam was not done. Will refer her to dermatology for follow-up. Anxiety: Patient has a history of anxiety and mood disorder. Currently she is anxious about having a malignancy. She does have a history of endometrial cancer and has had hysterectomy done. She is on Paxil. She just wants to make sure he has no malignancy. Given her fatigue and malaise we will run some blood work. She denies fever or chills or night sweats or weight loss.       Past Medical History  Past Medical History:   Diagnosis Date    Bipolar affective ([facility]     Cancer ([facility]     OVARIAN    Depression     Psychiatric disorder     anxiety, depresion, bipolar       Surgical History  Past Surgical History:   Procedure Laterality Date    HX GYN  2015    Uterine Artery Embolization-Brendon    HX HEENT  1976    Tonsilectonmy     HX HYSTERECTOMY          Medications  Current Outpatient Prescriptions   Medication Sig Dispense Refill    PARoxetine (PAXIL) 20 mg tablet TAKE 1 TABLET BY MOUTH DAILY 30 Tab 0       Allergies  No Known Allergies    Family History  Family History   Problem Relation Age of Onset    Depression Mother     Alcohol abuse Mother     Other Mother      Tobacco use     Arthritis-rheumatoid Mother     Ovarian Cancer Mother     Other Father      Tobacco use    Other Sister      Tobacco use    Alcohol abuse Paternal Grandfather     Cancer Paternal Grandmother      Lung Cancer    Arthritis-rheumatoid Maternal Grandmother     Arthritis-rheumatoid Maternal Grandfather        Social History  Social History     Social History    Marital status:      Spouse name: N/A    Number of children: N/A    Years of education: N/A     Occupational History    Asst. Manager      Social History Main Topics    Smoking status: Former Smoker     Quit date: 12/1/2009    Smokeless tobacco: Never Used    Alcohol use No    Drug use: Yes     Special: Cocaine      Comment: quit june 2012    Sexual activity: Not Currently     Partners: Male     Birth control/ protection: Condom     Other Topics Concern    Not on file     Social History Narrative    ** Merged History Encounter **         ** Merged History Encounter **            Review of Systems  Review of Systems - History obtained from chart review and the patient  General ROS: positive for  - fatigue, malaise and sleep disturbance  negative for - chills or fever  Psychological ROS: positive for - anxiety and behavioral disorder  Ophthalmic ROS: positive for - uses glasses  ENT ROS: negative  Allergy and Immunology ROS: negative  Hematological and Lymphatic ROS: negative  Endocrine ROS: positive for - malaise/lethargy and mood swings  negative for - hair pattern changes or polydipsia/polyuria  Respiratory ROS: no cough, shortness of breath, or wheezing  Cardiovascular ROS: no chest pain or dyspnea on exertion  Gastrointestinal ROS: positive for - gas/bloating  negative for - blood in stools, diarrhea or nausea/vomiting  Genito-Urinary ROS: positive for - urinary frequency  negative for - dysuria or hematuria  Musculoskeletal ROS: positive for - joint pain, joint stiffness and muscle pain  Neurological ROS: no TIA or stroke symptoms  Dermatological ROS: positive for - lumps and mole changes    Vital Signs  Visit Vitals    /77 (BP 1 Location: Left arm, BP Patient Position: Sitting)    Pulse 93    Temp 96.9 °F (36.1 °C) (Oral)    Resp 18    Ht 5' 7\" (1.702 m)    Wt 168 lb (76.2 kg)    LMP 08/02/2016    SpO2 97%    BMI 26.31 kg/m2         Physical Exam  Physical Examination: General appearance - oriented to person, place, and time, normal appearing weight, acyanotic, in no respiratory distress, well hydrated and anxious  Mental status - alert, oriented to person, place, and time, affect appropriate to mood  Eyes - sclera anicteric  Nose - normal and patent, no erythema, discharge or polyps and normal nontender sinuses  Mouth - mucous membranes moist, pharynx normal without lesions  Neck - supple, no significant adenopathy  Lymphatics - no palpable lymphadenopathy, no hepatosplenomegaly  Chest - clear to auscultation, no wheezes, rales or rhonchi, symmetric air entry, no tachypnea, retractions or cyanosis  Heart - normal rate, regular rhythm, normal S1, S2, no murmurs, rubs, clicks or gallops  Abdomen - soft, nontender, nondistended, no masses or organomegaly  bowel sounds normal  Back exam - limited range of motion  Neurological - alert, oriented, normal speech, no focal findings or movement disorder noted, motor and sensory grossly normal bilaterally  Musculoskeletal - no joint tenderness, deformity or swelling  Extremities - no pedal edema noted, intact peripheral pulses  Skin: multiple skin moles with SK lesions noted    Diagnostics  Orders Placed This Encounter    CBC WITH AUTOMATED DIFF     Standing Status:   Future     Standing Expiration Date:   10/7/2018    LIPID PANEL     Standing Status:   Future     Standing Expiration Date:   28/5/3305    METABOLIC PANEL, COMPREHENSIVE     Standing Status:   Future     Standing Expiration Date:   10/7/2018    T4, FREE     Standing Status:   Future     Standing Expiration Date:   10/7/2018    TSH 3RD GENERATION     Standing Status:   Future     Standing Expiration Date:   10/7/2018    RHEUMATOID FACTOR, QL     Standing Status:   Future     Standing Expiration Date:   10/7/2018    REFERRAL TO DERMATOLOGY     Referral Priority:   Routine     Referral Type:   Consultation     Referral Reason:   Specialty Services Required    AMB POC URINALYSIS DIP STICK AUTO W/O MICRO         Results  Results for orders placed or performed in visit on 06/19/17   CBC WITH AUTOMATED DIFF   Result Value Ref Range    WBC 2.7 (L) 3.4 - 10.8 x10E3/uL    RBC 4.43 3.77 - 5.28 x10E6/uL    HGB 13.4 11.1 - 15.9 g/dL    HCT 40.9 34.0 - 46.6 %    MCV 92 79 - 97 fL    MCH 30.2 26.6 - 33.0 pg    MCHC 32.8 31.5 - 35.7 g/dL    RDW 13.3 12.3 - 15.4 %    PLATELET 846 630 - 276 x10E3/uL    NEUTROPHILS 42 %    Lymphocytes 48 %    MONOCYTES 8 %    EOSINOPHILS 2 %    BASOPHILS 0 %    ABS. NEUTROPHILS 1.1 (L) 1.4 - 7.0 x10E3/uL    Abs Lymphocytes 1.3 0.7 - 3.1 x10E3/uL    ABS. MONOCYTES 0.2 0.1 - 0.9 x10E3/uL    ABS. EOSINOPHILS 0.1 0.0 - 0.4 x10E3/uL    ABS. BASOPHILS 0.0 0.0 - 0.2 x10E3/uL    IMMATURE GRANULOCYTES 0 %    ABS. IMM. GRANS. 0.0 0.0 - 0.1 x10E3/uL   TSH 3RD GENERATION   Result Value Ref Range    TSH 1.750 0.450 - 3.909 uIU/mL   METABOLIC PANEL, COMPREHENSIVE   Result Value Ref Range    Glucose 94 65 - 99 mg/dL    BUN 10 6 - 24 mg/dL    Creatinine 0.97 0.57 - 1.00 mg/dL    GFR est non-AA 70 >59 mL/min/1.73    GFR est AA 80 >59 mL/min/1.73    BUN/Creatinine ratio 10 9 - 23    Sodium 140 134 - 144 mmol/L    Potassium 4.4 3.5 - 5.2 mmol/L    Chloride 98 96 - 106 mmol/L    CO2 24 18 - 29 mmol/L    Calcium 9.9 8.7 - 10.2 mg/dL    Protein, total 7.8 6.0 - 8.5 g/dL    Albumin 4.9 3.5 - 5.5 g/dL    GLOBULIN, TOTAL 2.9 1.5 - 4.5 g/dL    A-G Ratio 1.7 1.2 - 2.2    Bilirubin, total 1.1 0.0 - 1.2 mg/dL    Alk. phosphatase 83 39 - 117 IU/L    AST (SGOT) 19 0 - 40 IU/L    ALT (SGPT) 13 0 - 32 IU/L   LYME AB TOTAL W/RFLX W BLOT   Result Value Ref Range    Lyme Ab, IgG/IgM <0.91 0.00 - 0.90 ISR       ASSESSMENT and PLAN    ICD-10-CM ICD-9-CM    1. Malaise and fatigue R53.81 780.79 CBC WITH AUTOMATED DIFF    O45.03  METABOLIC PANEL, COMPREHENSIVE      T4, FREE      TSH 3RD GENERATION      AMB POC URINALYSIS DIP STICK AUTO W/O MICRO   2. Arthralgia, unspecified joint M25.50 719.40 RHEUMATOID FACTOR, QL   3. Skin lesions, generalized L98.9 709.9 REFERRAL TO DERMATOLOGY   4. Urinary frequency R35.0 788.41    5. Hx of iron deficiency anemia Z86.2 V12.3    6. Adjustment disorder with depressed mood F43.21 309.0    7. Screening for cardiovascular condition Z13.6 V81.2 LIPID PANEL   8.  Chronic nonintractable headache, unspecified headache type R51 784.0      lab results and schedule of future lab studies reviewed with patient  reviewed diet, exercise and weight control  reviewed medications and side effects in detail      I have discussed the diagnosis with the patient and the intended plan of care as seen in the above orders. The patient has received an after-visit summary and questions were answered concerning future plans. I have discussed medication, side effects, and warnings with the patient in detail. The patient verbalized understanding and is in agreement with the plan of care. The patient will contact the office with any additional concerns.     Lyubov Welch MD

## 2017-10-09 ENCOUNTER — LAB ONLY (OUTPATIENT)
Dept: FAMILY MEDICINE CLINIC | Age: 47
End: 2017-10-09

## 2017-10-09 DIAGNOSIS — Z01.89 ENCOUNTER FOR LABORATORY TEST: Primary | ICD-10-CM

## 2017-10-09 LAB
BILIRUB UR QL STRIP: NEGATIVE
GLUCOSE UR-MCNC: NEGATIVE MG/DL
KETONES P FAST UR STRIP-MCNC: NEGATIVE MG/DL
PH UR STRIP: 7 [PH] (ref 4.6–8)
PROT UR QL STRIP: NEGATIVE MG/DL
SP GR UR STRIP: 1.01 (ref 1–1.03)
UA UROBILINOGEN AMB POC: NORMAL (ref 0.2–1)
URINALYSIS CLARITY POC: CLEAR
URINALYSIS COLOR POC: YELLOW
URINE BLOOD POC: NEGATIVE
URINE LEUKOCYTES POC: NEGATIVE
URINE NITRITES POC: NEGATIVE

## 2017-10-09 NOTE — PROGRESS NOTES
Patient here today for lab visit at this time. Labs ordered by Dr. Juliann Navas RF, TSH, T4,CMP, AND CBC. Patient tolerated well at this time. Venipuncture to right forearm at this time.

## 2017-10-10 LAB
ALBUMIN SERPL-MCNC: 4.6 G/DL (ref 3.5–5.5)
ALBUMIN/GLOB SERPL: 2.1 {RATIO} (ref 1.2–2.2)
ALP SERPL-CCNC: 87 IU/L (ref 39–117)
ALT SERPL-CCNC: 10 IU/L (ref 0–32)
AST SERPL-CCNC: 16 IU/L (ref 0–40)
BASOPHILS # BLD AUTO: 0 X10E3/UL (ref 0–0.2)
BASOPHILS NFR BLD AUTO: 1 %
BILIRUB SERPL-MCNC: 0.8 MG/DL (ref 0–1.2)
BUN SERPL-MCNC: 12 MG/DL (ref 6–24)
BUN/CREAT SERPL: 14 (ref 9–23)
CALCIUM SERPL-MCNC: 9.3 MG/DL (ref 8.7–10.2)
CHLORIDE SERPL-SCNC: 100 MMOL/L (ref 96–106)
CHOLEST SERPL-MCNC: 222 MG/DL (ref 100–199)
CO2 SERPL-SCNC: 25 MMOL/L (ref 18–29)
CREAT SERPL-MCNC: 0.87 MG/DL (ref 0.57–1)
EOSINOPHIL # BLD AUTO: 0.1 X10E3/UL (ref 0–0.4)
EOSINOPHIL NFR BLD AUTO: 2 %
ERYTHROCYTE [DISTWIDTH] IN BLOOD BY AUTOMATED COUNT: 13.9 % (ref 12.3–15.4)
GLOBULIN SER CALC-MCNC: 2.2 G/DL (ref 1.5–4.5)
GLUCOSE SERPL-MCNC: 105 MG/DL (ref 65–99)
HCT VFR BLD AUTO: 39.3 % (ref 34–46.6)
HDLC SERPL-MCNC: 75 MG/DL
HGB BLD-MCNC: 12.9 G/DL (ref 11.1–15.9)
IMM GRANULOCYTES # BLD: 0 X10E3/UL (ref 0–0.1)
IMM GRANULOCYTES NFR BLD: 0 %
INTERPRETATION, 910389: NORMAL
LDLC SERPL CALC-MCNC: 122 MG/DL (ref 0–99)
LYMPHOCYTES # BLD AUTO: 1.8 X10E3/UL (ref 0.7–3.1)
LYMPHOCYTES NFR BLD AUTO: 44 %
MCH RBC QN AUTO: 29.7 PG (ref 26.6–33)
MCHC RBC AUTO-ENTMCNC: 32.8 G/DL (ref 31.5–35.7)
MCV RBC AUTO: 91 FL (ref 79–97)
MONOCYTES # BLD AUTO: 0.4 X10E3/UL (ref 0.1–0.9)
MONOCYTES NFR BLD AUTO: 9 %
NEUTROPHILS # BLD AUTO: 1.7 X10E3/UL (ref 1.4–7)
NEUTROPHILS NFR BLD AUTO: 44 %
PLATELET # BLD AUTO: 233 X10E3/UL (ref 150–379)
POTASSIUM SERPL-SCNC: 4.6 MMOL/L (ref 3.5–5.2)
PROT SERPL-MCNC: 6.8 G/DL (ref 6–8.5)
RBC # BLD AUTO: 4.34 X10E6/UL (ref 3.77–5.28)
RHEUMATOID FACT SERPL-ACNC: <10 IU/ML (ref 0–13.9)
SODIUM SERPL-SCNC: 140 MMOL/L (ref 134–144)
T4 FREE SERPL-MCNC: 0.91 NG/DL (ref 0.82–1.77)
TRIGL SERPL-MCNC: 124 MG/DL (ref 0–149)
TSH SERPL DL<=0.005 MIU/L-ACNC: 1.39 UIU/ML (ref 0.45–4.5)
VLDLC SERPL CALC-MCNC: 25 MG/DL (ref 5–40)
WBC # BLD AUTO: 4 X10E3/UL (ref 3.4–10.8)

## 2017-10-11 NOTE — PROGRESS NOTES
Please let patient know that her LDL cholesterol is elevated at 122. She can reduce this by exercise and avoiding polysaturated fats in her diet. Recheck labs in 3 months. Rest of her labs are stable.   Juli Colvin MD

## 2017-10-21 DIAGNOSIS — F39 MOOD DISORDER (HCC): ICD-10-CM

## 2017-10-23 RX ORDER — PAROXETINE HYDROCHLORIDE 20 MG/1
TABLET, FILM COATED ORAL
Qty: 30 TAB | Refills: 0 | Status: SHIPPED | OUTPATIENT
Start: 2017-10-23 | End: 2018-01-10 | Stop reason: SDUPTHER

## 2017-11-26 DIAGNOSIS — F39 MOOD DISORDER (HCC): ICD-10-CM

## 2017-11-27 RX ORDER — PAROXETINE HYDROCHLORIDE 20 MG/1
TABLET, FILM COATED ORAL
Qty: 30 TAB | Refills: 0 | Status: SHIPPED | OUTPATIENT
Start: 2017-11-27 | End: 2018-03-19

## 2018-01-10 DIAGNOSIS — F39 MOOD DISORDER (HCC): ICD-10-CM

## 2018-01-10 RX ORDER — PAROXETINE HYDROCHLORIDE 20 MG/1
TABLET, FILM COATED ORAL
Qty: 30 TAB | Refills: 0 | Status: SHIPPED | OUTPATIENT
Start: 2018-01-10 | End: 2018-03-19

## 2018-02-10 DIAGNOSIS — F39 MOOD DISORDER (HCC): ICD-10-CM

## 2018-02-12 RX ORDER — PAROXETINE HYDROCHLORIDE 20 MG/1
TABLET, FILM COATED ORAL
Qty: 30 TAB | Refills: 0 | Status: SHIPPED | OUTPATIENT
Start: 2018-02-12 | End: 2018-03-19 | Stop reason: SDDI

## 2018-03-19 ENCOUNTER — HOSPITAL ENCOUNTER (OUTPATIENT)
Dept: LAB | Age: 48
Discharge: HOME | End: 2018-03-19

## 2018-03-19 ENCOUNTER — OFFICE VISIT (OUTPATIENT)
Dept: FAMILY MEDICINE CLINIC | Age: 48
End: 2018-03-19

## 2018-03-19 VITALS
OXYGEN SATURATION: 99 % | WEIGHT: 205 LBS | SYSTOLIC BLOOD PRESSURE: 136 MMHG | TEMPERATURE: 97.9 F | DIASTOLIC BLOOD PRESSURE: 100 MMHG | HEART RATE: 75 BPM | BODY MASS INDEX: 32.18 KG/M2 | RESPIRATION RATE: 16 BRPM | HEIGHT: 67 IN

## 2018-03-19 DIAGNOSIS — Z13.1 SCREENING FOR DIABETES MELLITUS: ICD-10-CM

## 2018-03-19 DIAGNOSIS — M79.7 FIBROMYALGIA: ICD-10-CM

## 2018-03-19 DIAGNOSIS — R05.9 COUGH: ICD-10-CM

## 2018-03-19 DIAGNOSIS — R52 PAIN OF MULTIPLE SITES: ICD-10-CM

## 2018-03-19 DIAGNOSIS — F32.89 OTHER DEPRESSION: Primary | ICD-10-CM

## 2018-03-19 DIAGNOSIS — R23.2 HOT FLASHES: ICD-10-CM

## 2018-03-19 DIAGNOSIS — R93.89 ABNORMAL THYROID ULTRASOUND: ICD-10-CM

## 2018-03-19 DIAGNOSIS — R53.82 CHRONIC FATIGUE: ICD-10-CM

## 2018-03-19 DIAGNOSIS — F10.10 ALCOHOL ABUSE: ICD-10-CM

## 2018-03-19 DIAGNOSIS — Z78.9 ALCOHOL USE: ICD-10-CM

## 2018-03-19 DIAGNOSIS — Z85.43 H/O OVARIAN CANCER: ICD-10-CM

## 2018-03-19 PROCEDURE — 99001 SPECIMEN HANDLING PT-LAB: CPT | Performed by: FAMILY MEDICINE

## 2018-03-19 RX ORDER — DULOXETIN HYDROCHLORIDE 30 MG/1
30 CAPSULE, DELAYED RELEASE ORAL DAILY
Qty: 30 CAP | Refills: 1 | Status: SHIPPED | OUTPATIENT
Start: 2018-03-19 | End: 2018-05-24 | Stop reason: SDUPTHER

## 2018-03-19 NOTE — PROGRESS NOTES
HISTORY OF PRESENT ILLNESS  Mounika Carlson is a 52 y.o. female. HPI: here as a new patient to get establish care. Was Dr. Sophie Smiley pt and then was following Dr. Airam Diaz and switching back to this office again. Multiple medical concern. H/o depression. Initially was diagnosed with personality disorder/ bipolar disorder but per patient it was misdiagnosed as she was with someone who was the reason for the psychiatric problem as she was feeling depressed and was started on bipolar medication which made her felt like suicidal ideation and also increase use of alcohol due to social situation and cocaine use as well. Currently she has no use of cocaine. Completed AA meetings and quit alcohol. currently doing very little and more social drinks. Still discussed to stop completely and she is aware. Currently following counselor but not psychiatrist. Was on paxil but tapered herself off of it as well. H/o chronic fatigue. Multiple site pain, muscle ache following Dr. [facility] rheumatologist. Was recently diagnosed with fibromyalgia. Prior h/o ovarian cancer and has total hysterectomy. Still following her ob/gyn. Denies any pelvic pain or vaginal discharge. Gets worried with her health due to prior h/o cancer. Also noted abnormal thyroid ultrasound in the chart done last year. Noted rt thyroid mass and recommended 1,2,3 and 5 years of repeat ultrasound. She has been worried about her weight gain and mood changes. Denies any heat or cold intolerance. No trouble swallowing or change in voice. Having some cold symptoms currently. No wheezing. No chest congestion. No nasal congestion. Also noted on Dr. Christine Colcord note that she was worried about some skin changes. She has seen dermatologist and still following them. No new skin changes at this time. Noted mild elevated blood pressure. Could be coming to a new physician. Asymptomatic as denied any headache, dizziness, no chest pain or sob. No abdominal pain.  No urinary or bowel complains. Will observe for now . Visit Vitals    BP (!) 136/100 (BP 1 Location: Left arm, BP Patient Position: Sitting)    Pulse 75    Temp 97.9 °F (36.6 °C) (Oral)    Resp 16    Ht 5' 7\" (1.702 m)    Wt 205 lb (93 kg)    SpO2 99%    BMI 32.11 kg/m2     Review medication list, vitals, problem list,allergies. Lab Results   Component Value Date/Time    WBC 4.0 10/09/2017 02:18 PM    HGB 12.9 10/09/2017 02:18 PM    HCT 39.3 10/09/2017 02:18 PM    PLATELET 459 85/49/4921 02:18 PM    MCV 91 10/09/2017 02:18 PM     Lab Results   Component Value Date/Time    Sodium 140 10/09/2017 02:18 PM    Potassium 4.6 10/09/2017 02:18 PM    Chloride 100 10/09/2017 02:18 PM    CO2 25 10/09/2017 02:18 PM    Anion gap 3 09/27/2016 07:30 AM    Glucose 105 (H) 10/09/2017 02:18 PM    Glucose 93 10/24/2012 06:45 AM    BUN 12 10/09/2017 02:18 PM    Creatinine 0.87 10/09/2017 02:18 PM    BUN/Creatinine ratio 14 10/09/2017 02:18 PM    GFR est AA 92 10/09/2017 02:18 PM    GFR est non-AA 80 10/09/2017 02:18 PM    Calcium 9.3 10/09/2017 02:18 PM    Bilirubin, total 0.8 10/09/2017 02:18 PM    AST (SGOT) 16 10/09/2017 02:18 PM    Alk. phosphatase 87 10/09/2017 02:18 PM    Protein, total 6.8 10/09/2017 02:18 PM    Albumin 4.6 10/09/2017 02:18 PM    Globulin 2.6 09/01/2016 12:28 PM    A-G Ratio 2.1 10/09/2017 02:18 PM    ALT (SGPT) 10 10/09/2017 02:18 PM     Lab Results   Component Value Date/Time    TSH 1.390 10/09/2017 02:18 PM     Lab Results   Component Value Date/Time    Cholesterol, total 222 (H) 10/09/2017 02:18 PM    HDL Cholesterol 75 10/09/2017 02:18 PM    LDL, calculated 122 (H) 10/09/2017 02:18 PM    VLDL, calculated 25 10/09/2017 02:18 PM    Triglyceride 124 10/09/2017 02:18 PM    CHOL/HDL Ratio 2.4 10/24/2012 06:45 AM         ROS: see HPI     Physical Exam   Constitutional: She is oriented to person, place, and time. No distress. Neck: No thyromegaly present.    Cardiovascular: Normal rate, regular rhythm and normal heart sounds. Pulmonary/Chest:   CTA   Abdominal: Soft. Bowel sounds are normal. There is no tenderness. Musculoskeletal: She exhibits no edema. Neurological: She is oriented to person, place, and time. Psychiatric: Her behavior is normal. Thought content normal.       ASSESSMENT and PLAN    ICD-10-CM ICD-9-CM    1. Other depression: for now will consider starting Cymbalta as she is off Paxil and also recently diagnosed with fibromyalgia. Discussed medication side effects and f/u next visit. F32.89 311     miss diagnosed with borderline persionality disorder. follwoing therapist at this time. demetrio morataya diagnosis of depression. 2. Chronic fatigue; checking labs. Could be multiple reason. R53.82 780.79 DULoxetine (CYMBALTA) 30 mg capsule      METABOLIC PANEL, COMPREHENSIVE      VITAMIN D, 25 HYDROXY      TSH 3RD GENERATION      CBC W/O DIFF   3. Fibromyalgia: starting cymbalta. See above. M79.7 729.1 DULoxetine (CYMBALTA) 30 mg capsule   4. Hot flashes: continue black cohosh and also starting cymbalta. R23.2 782.62 DULoxetine (CYMBALTA) 30 mg capsule      TSH 3RD GENERATION      CBC W/O DIFF   5. Pain of multiple sites: see above  R52 780.96 DULoxetine (CYMBALTA) 30 mg capsule      METABOLIC PANEL, COMPREHENSIVE      VITAMIN D, 25 HYDROXY   6. Screening for diabetes mellitus Z13.1 V77.1 HEMOGLOBIN A1C WITH EAG   7. Cough: observe for now . Recent Er visit. Symptomatic treatment. R05 786.2    8. BMI 32.0-32.9,adult: discussed high BMI. Discussed diet modification, calorie count and exercise. Discussed importance of weight loss. agree to do exercise and life style modification. Diet and exercise hand out given in AVS. Also done nutritional referral.    Z68.32 V85.32 REFERRAL TO NUTRITION   9. Alcohol use: she is going to stop drinking. Past h/o addiction and had gone through [facility]. See HPI.  Z78.9 V49.89    10. Alcohol abuse: see above  F10.10 305.00    11.  H/O ovarian cancer: post surgery. Following ob/gyn. Z85.43 V10.43     total hysterectomy with salpingooophorectomy. 12. Abnormal thyroid ultrasound: for now repeat ultrasound. Noted rt thyroid mass. Checking thyroid function and also sending to endo. R93.8 794.5 US THYROID/PARATHYROID/SOFT TISS      REFERRAL TO ENDOCRINOLOGY   Pt understood and agree with the plan   Review HM   Elevated blood pressure. Asymptomatic. Chronic fatigue and multiple site pain. For now will recheck on follow up and advised low salt diet. Follow-up Disposition:  Return in about 2 months (around 5/19/2018).

## 2018-03-19 NOTE — PATIENT INSTRUCTIONS
Learning About Physical Activity  What is physical activity? Physical activity is any kind of activity that gets your body moving. The types of physical activity that can help you get fit and stay healthy include:  · Aerobic or \"cardio\" activities that make your heart beat faster and make you breathe harder, such as brisk walking, riding a bike, or running. Aerobic activities strengthen your heart and lungs and build up your endurance. · Strength training activities that make your muscles work against, or \"resist,\" something, such as lifting weights or doing push-ups. These activities help tone and strengthen your muscles. · Stretches that allow you to move your joints and muscles through their full range of motion. Stretching helps you be more flexible and avoid injury. What are the benefits of physical activity? Being active is one of the best things you can do to get fit and stay healthy. It helps you to:  · Feel stronger and have more energy to do all the things you like to do. · Focus better at school or work and perform better in sports. · Feel, think, and sleep better. · Reach and stay at a healthy weight. · Lose fat and build lean muscle. · Lower your risk for serious health problems. · Keep your bones, muscles, and joints strong. Being fit lets you do more physical activity. And it lets you work out harder without as much effort. How can you make physical activity part of your life? Get at least 30 minutes of exercise on most days of the week. Walking is a good choice. You also may want to do other activities, such as running, swimming, cycling, or playing tennis or team sports. Pick activities that you like-ones that make your heart beat faster, your muscles stronger, and your muscles and joints more flexible. If you find more than one thing you like doing, do them all. You don't have to do the same thing every day. Get your heart pumping every day.  Any activity that makes your heart beat faster and keeps it at that rate for a while counts. Here are some great ways to get your heart beating faster:  · Go for a brisk walk, run, or bike ride. · Go for a hike or swim. · Go in-line skating. · Play a game of touch football, basketball, or soccer. · Ride a bike. · Play tennis or racquetball. · Climb stairs. Even some household chores can be aerobic-just do them at a faster pace. Vacuuming, raking or mowing the lawn, sweeping the garage, and washing and waxing the car all can help get your heart rate up. Strengthen your muscles during the week. You don't have to lift heavy weights or grow big, bulky muscles to get stronger. Doing a few simple activities that make your muscles work against, or \"resist,\" something can help you get stronger. For example, you can:  · Do push-ups or sit-ups, which use your own body weight as resistance. · Lift weights or dumbbells or use stretch bands at home or in a gym or community center. Stretch your muscles often. Stretching will help you as you become more active. It can help you stay flexible, loosen tight muscles, and avoid injury. It can also help improve your balance and posture and can be a great way to relax. Be sure to stretch the muscles you'll be using when you work out. It's best to warm your muscles slightly before you stretch them. Walk or do some other light aerobic activity for a few minutes, and then start stretching. When you stretch your muscles:  · Do it slowly. Stretching is not about going fast or making sudden movements. · Don't push or bounce during a stretch. · Hold each stretch for at least 15 to 30 seconds, if you can. You should feel a stretch in the muscle, but not pain. · Breathe out as you do the stretch. Then breathe in as you hold the stretch. Don't hold your breath. If you're worried about how more activity might affect your health, have a checkup before you start.  Follow any special advice your doctor gives you for getting a smart start. Where can you learn more? Go to http://alexander-celena.info/. Enter W081 in the search box to learn more about \"Learning About Physical Activity. \"  Current as of: March 13, 2017  Content Version: 11.4  © 7617-7639 [employer]. Care instructions adapted under license by [employer] (which disclaims liability or warranty for this information). If you have questions about a medical condition or this instruction, always ask your healthcare professional. [address] any warranty or liability for your use of this information. Depression and Chronic Disease: Care Instructions  Your Care Instructions    A chronic disease is one that you have for a long time. Some chronic diseases can be controlled, but they usually cannot be cured. Depression is common in people with chronic diseases, but it often goes unnoticed. Many people have concerns about seeking treatment for a mental health problem. You may think it's a sign of weakness, or you don't want people to know about it. It's important to overcome these reasons for not seeking treatment. Treating depression or anxiety is good for your health. Follow-up care is a key part of your treatment and safety. Be sure to make and go to all appointments, and call your doctor if you are having problems. It's also a good idea to know your test results and keep a list of the medicines you take. How can you care for yourself at home? Watch for symptoms of depression  The symptoms of depression are often subtle at first. You may think they are caused by your disease rather than depression. Or you may think it is normal to be depressed when you have a chronic disease. If you are depressed you may:  · Feel sad or hopeless. · Feel guilty or worthless. · Not enjoy the things you used to enjoy. · Feel hopeless, as though life is not worth living.   · Have trouble thinking or remembering. · Have low energy, and you may not eat or sleep well. · Pull away from others. · Think often about death or killing yourself. (Keep the numbers for these national suicide hotlines: 2-800-411-TALK [1-298.105.6621] and 4-113-ZCVDZCT [1-342.974.7946]. )  Get treatment  By treating your depression, you can feel more hopeful and have more energy. If you feel better, you may take better care of yourself, so your health may improve. · Talk to your doctor if you have any changes in mood during treatment for your disease. · Ask your doctor for help. Counseling, antidepressant medicine, or a combination of the two can help most people with depression. Often a combination works best. Counseling can also help you cope with having a chronic disease. When should you call for help? Call 911 anytime you think you may need emergency care. For example, call if:  ? · You feel like hurting yourself or someone else. ? · Someone you know has depression and is about to attempt or is attempting suicide. ?Call your doctor now or seek immediate medical care if:  ? · You hear voices. ? · Someone you know has depression and:  ¨ Starts to give away his or her possessions. ¨ Uses illegal drugs or drinks alcohol heavily. ¨ Talks or writes about death, including writing suicide notes or talking about guns, knives, or pills. ¨ Starts to spend a lot of time alone. ¨ Acts very aggressively or suddenly appears calm. ? Watch closely for changes in your health, and be sure to contact your doctor if:  ? · You do not get better as expected. Where can you learn more? Go to http://alexander-celena.info/. Enter M766 in the search box to learn more about \"Depression and Chronic Disease: Care Instructions. \"  Current as of: May 12, 2017  Content Version: 11.4  © 9511-9275 [employer].  Care instructions adapted under license by [employer] (which disclaims liability or warranty for this information). If you have questions about a medical condition or this instruction, always ask your healthcare professional. [address] any warranty or liability for your use of this information. Fibromyalgia: Care Instructions  Your Care Instructions    Fibromyalgia is a painful condition that is not completely understood by medical experts. The cause of fibromyalgia is not known. It can make you feel tired and ache all over. It causes tender spots at specific points of the body that hurt only when you press on them. You may have trouble sleeping, as well as other symptoms. These problems can upset your work and home life. Symptoms tend to come and go, although they may never go away completely. Fibromyalgia does not harm your muscles, joints, or organs. Follow-up care is a key part of your treatment and safety. Be sure to make and go to all appointments, and call your doctor if you are having problems. It's also a good idea to know your test results and keep a list of the medicines you take. How can you care for yourself at home? · Exercise often. Walk, swim, or bike to help with pain and sleep problems and to make you feel better. · Try to get a good night's sleep. Go to bed and get up at the same time each day, whether you feel rested or not. Make sure you have a good mattress and pillow. · Reduce stress. Avoid things that cause you stress, if you can. If not, work at making them less stressful. Learn to use biofeedback, guided imagery, meditation, or other methods to relax. · Make healthy changes. Eat a balanced diet, quit smoking, and limit alcohol and caffeine. · Use a heating pad set on low or take warm baths or showers for pain. Using cold packs for up to 20 minutes at a time can also relieve pain. Put a thin cloth between the cold pack and your skin. A gentle massage might help too. · Be safe with medicines. Take your medicines exactly as prescribed.  Call your doctor if you think you are having a problem with your medicine. Your doctor may talk to you about taking antidepressant medicines. These medicines may improve sleep, relieve pain, and in some cases treat depression. · Learn about fibromyalgia. This makes coping easier. Then, take an active role in your treatment. · Think about joining a support group with others who have fibromyalgia to learn more and get support. When should you call for help? Watch closely for changes in your health, and be sure to contact your doctor if:  ? · You feel sad, helpless, or hopeless; lose interest in things you used to enjoy; or have other symptoms of depression. ? · Your fibromyalgia symptoms get worse. Where can you learn more? Go to http://alexander-celena.info/. Enter V003 in the search box to learn more about \"Fibromyalgia: Care Instructions. \"  Current as of: October 14, 2016  Content Version: 11.4  © 4198-1670 [employer]. Care instructions adapted under license by [employer] (which disclaims liability or warranty for this information). If you have questions about a medical condition or this instruction, always ask your healthcare professional. [address] any warranty or liability for your use of this information.

## 2018-03-19 NOTE — PROGRESS NOTES
Chief Complaint   Patient presents with   [address] Other     1 year remission from [address] Milus Mohs Other     Health issues since Cancer    Menopause     Wants to get on new med    Swelling     indiates that it feels like all her lymph nodes are swollen and inflammed    Sinus Pain     with HA

## 2018-03-19 NOTE — MR AVS SNAPSHOT
[address] [address] 272 [facility] 
[phone] Patient: Deloris Kehr MRN: [id number] Visit Information Date & Time Provider Department Dept. Phone Encounter #  
 3/19/2018 10:45 AM [place] [facility], [address] [place] [address] [id number] Follow-up Instructions Return in about 2 months (around 5/19/2018). Upcoming Health Maintenance Date Due Pneumococcal 19-64 Highest Risk (2 of 3 - PCV13) 8/22/2018 DTaP/Tdap/Td series (2 - Td) 8/22/2027 Allergies as of 3/19/2018  Review Complete On: 3/19/2018 By: Leena Graham LPN No Known Allergies Current Immunizations  Reviewed on 3/11/2016 Name Date Influenza Vaccine (Quad) PF 10/13/2016  2:49 PM  
  
 Not reviewed this visit You Were Diagnosed With   
  
 Codes Comments Other depression    -  Primary ICD-10-CM: F32.89 ICD-9-CM: 311 miss diagnosed with borderline persionality disorder. follwoing therapist at this time. demetrio morataya diagnosis of depression. Chronic fatigue     ICD-10-CM: R53.82 
ICD-9-CM: 780.79 Fibromyalgia     ICD-10-CM: M79.7 ICD-9-CM: 729.1 Hot flashes     ICD-10-CM: R23.2 ICD-9-CM: 782.62 Pain of multiple sites     ICD-10-CM: R52 ICD-9-CM: 780.96 Screening for diabetes mellitus     ICD-10-CM: Z13.1 ICD-9-CM: V77.1 Cough     ICD-10-CM: R05 ICD-9-CM: 786.2 BMI 32.0-32.9,adult     ICD-10-CM: J59.61 
ICD-9-CM: V85.32 Alcohol use     ICD-10-CM: Z78.9 ICD-9-CM: V49.89 Alcohol abuse     ICD-10-CM: F10.10 ICD-9-CM: 305.00   
 H/O ovarian cancer     ICD-10-CM: Z85.43 
ICD-9-CM: V10.43 total hysterectomy with salpingooophorectomy. Abnormal thyroid ultrasound     ICD-10-CM: R93.8 ICD-9-CM: 794.5 Vitals BP Pulse Temp Resp Height(growth percentile) Weight(growth percentile) (!) 136/100 (BP 1 Location: Left arm, BP Patient Position: Sitting) 75 97.9 °F (36.6 °C) (Oral) 16 5' 7\" (1.702 m) 205 lb (93 kg) LMP SpO2 BMI OB Status Smoking Status 08/02/2016 99% 32.11 kg/m2 Hysterectomy Former Smoker BMI and BSA Data Body Mass Index Body Surface Area  
 32.11 kg/m 2 2.1 m 2 Preferred Pharmacy Pharmacy Name Phone [id number]- [place], [address] [facility] [phone] Your Updated Medication List  
  
   
This list is accurate as of 3/19/18 12:31 PM.  Always use your most recent med list.  
  
  
  
  
 DULoxetine 30 mg capsule Commonly known as:  CYMBALTA Take 1 Cap by mouth daily. OTHER Tumeric Prescriptions Sent to Pharmacy Refills DULoxetine (CYMBALTA) 30 mg capsule 1 Sig: Take 1 Cap by mouth daily. Class: Normal  
 [facility] #: [phone] Route: Oral  
  
We Performed the Following REFERRAL TO ENDOCRINOLOGY [LWK62 Custom] REFERRAL TO NUTRITION [REF50 Custom] Follow-up Instructions Return in about 2 months (around 5/19/2018). To-Do List   
 03/19/2018 Lab:  CBC W/O DIFF   
  
 03/19/2018 Lab:  HEMOGLOBIN A1C WITH EAG   
  
 03/19/2018 Lab:  METABOLIC PANEL, COMPREHENSIVE   
  
 03/19/2018 Lab:  TSH 3RD GENERATION   
  
 03/19/2018 Imaging:  US THYROID/PARATHYROID/SOFT TISS   
  
 03/19/2018 Lab:  VITAMIN D, 25 HYDROXY Referral Information Referral ID Referred By Referred To  
  
 [id number] [facility] R Not Available Visits Status Start Date End Date 1 New Request 3/19/18 3/19/19 If your referral has a status of pending review or denied, additional information will be sent to support the outcome of this decision. Referral ID Referred By Referred To  
 [id number] [facility], [address] [place], [address] Phone: [phone] Fax: [phone] Visits Status Start Date End Date 1 New Request 3/19/18 3/19/19 If your referral has a status of pending review or denied, additional information will be sent to support the outcome of this decision. Patient Instructions Learning About Physical Activity What is physical activity? Physical activity is any kind of activity that gets your body moving. The types of physical activity that can help you get fit and stay healthy include: · Aerobic or \"cardio\" activities that make your heart beat faster and make you breathe harder, such as brisk walking, riding a bike, or running. Aerobic activities strengthen your heart and lungs and build up your endurance. · Strength training activities that make your muscles work against, or \"resist,\" something, such as lifting weights or doing push-ups. These activities help tone and strengthen your muscles. · Stretches that allow you to move your joints and muscles through their full range of motion. Stretching helps you be more flexible and avoid injury. What are the benefits of physical activity? Being active is one of the best things you can do to get fit and stay healthy. It helps you to: · Feel stronger and have more energy to do all the things you like to do. · Focus better at school or work and perform better in sports. · Feel, think, and sleep better. · Reach and stay at a healthy weight. · Lose fat and build lean muscle. · Lower your risk for serious health problems. · Keep your bones, muscles, and joints strong. Being fit lets you do more physical activity. And it lets you work out harder without as much effort. How can you make physical activity part of your life? Get at least 30 minutes of exercise on most days of the week. Walking is a good choice. You also may want to do other activities, such as running, swimming, cycling, or playing tennis or team sports. Pick activities that you like-ones that make your heart beat faster, your muscles stronger, and your muscles and joints more flexible. If you find more than one thing you like doing, do them all. You don't have to do the same thing every day. Get your heart pumping every day. Any activity that makes your heart beat faster and keeps it at that rate for a while counts. Here are some great ways to get your heart beating faster: · Go for a brisk walk, run, or bike ride. · Go for a hike or swim. · Go in-line skating. · Play a game of touch football, basketball, or soccer. · Ride a bike. · Play tennis or racquetball. · Climb stairs. Even some household chores can be aerobic-just do them at a faster pace. Vacuuming, raking or mowing the lawn, sweeping the garage, and washing and waxing the car all can help get your heart rate up. Strengthen your muscles during the week. You don't have to lift heavy weights or grow big, bulky muscles to get stronger. Doing a few simple activities that make your muscles work against, or \"resist,\" something can help you get stronger. For example, you can: · Do push-ups or sit-ups, which use your own body weight as resistance. · Lift weights or dumbbells or use stretch bands at home or in a gym or community center. Stretch your muscles often. Stretching will help you as you become more active. It can help you stay flexible, loosen tight muscles, and avoid injury. It can also help improve your balance and posture and can be a great way to relax. Be sure to stretch the muscles you'll be using when you work out. It's best to warm your muscles slightly before you stretch them. Walk or do some other light aerobic activity for a few minutes, and then start stretching. When you stretch your muscles: · Do it slowly. Stretching is not about going fast or making sudden movements. · Don't push or bounce during a stretch. · Hold each stretch for at least 15 to 30 seconds, if you can. You should feel a stretch in the muscle, but not pain. · Breathe out as you do the stretch. Then breathe in as you hold the stretch. Don't hold your breath. If you're worried about how more activity might affect your health, have a checkup before you start. Follow any special advice your doctor gives you for getting a smart start. Where can you learn more? Go to http://alexander-celena.info/. Enter R210 in the search box to learn more about \"Learning About Physical Activity. \" Current as of: March 13, 2017 Content Version: 11.4 © 7265-1619 [employer]. Care instructions adapted under license by MobilePaks (which disclaims liability or warranty for this information). If you have questions about a medical condition or this instruction, always ask your healthcare professional. [address] [address] any warranty or liability for your use of this information. Depression and Chronic Disease: Care Instructions Your Care Instructions A chronic disease is one that you have for a long time. Some chronic diseases can be controlled, but they usually cannot be cured. Depression is common in people with chronic diseases, but it often goes unnoticed. Many people have concerns about seeking treatment for a mental health problem. You may think it's a sign of weakness, or you don't want people to know about it. It's important to overcome these reasons for not seeking treatment. Treating depression or anxiety is good for your health. Follow-up care is a key part of your treatment and safety. Be sure to make and go to all appointments, and call your doctor if you are having problems. It's also a good idea to know your test results and keep a list of the medicines you take. How can you care for yourself at home? Watch for symptoms of depression The symptoms of depression are often subtle at first. You may think they are caused by your disease rather than depression. Or you may think it is normal to be depressed when you have a chronic disease. If you are depressed you may: · Feel sad or hopeless. · Feel guilty or worthless. · Not enjoy the things you used to enjoy. · Feel hopeless, as though life is not worth living. · Have trouble thinking or remembering. · Have low energy, and you may not eat or sleep well. · Pull away from others. · Think often about death or killing yourself. (Keep the numbers for these national suicide hotlines: 9-684-933-TALK [1-464.716.8536] and 3-169-FYDPDUS [1-472.122.4990]. ) Get treatment By treating your depression, you can feel more hopeful and have more energy. If you feel better, you may take better care of yourself, so your health may improve. · Talk to your doctor if you have any changes in mood during treatment for your disease. · Ask your doctor for help. Counseling, antidepressant medicine, or a combination of the two can help most people with depression. Often a combination works best. Counseling can also help you cope with having a chronic disease. When should you call for help? Call 911 anytime you think you may need emergency care. For example, call if: 
? · You feel like hurting yourself or someone else. ? · Someone you know has depression and is about to attempt or is attempting suicide. ?Call your doctor now or seek immediate medical care if: 
? · You hear voices. ? · Someone you know has depression and: 
¨ Starts to give away his or her possessions. ¨ Uses illegal drugs or drinks alcohol heavily. ¨ Talks or writes about death, including writing suicide notes or talking about guns, knives, or pills. ¨ Starts to spend a lot of time alone. ¨ Acts very aggressively or suddenly appears calm. ? Watch closely for changes in your health, and be sure to contact your doctor if: ? · You do not get better as expected. Where can you learn more? Go to http://alexander-celena.info/. Enter Y055 in the search box to learn more about \"Depression and Chronic Disease: Care Instructions. \" Current as of: May 12, 2017 Content Version: 11.4 © 2785-7184 Vidly. Care instructions adapted under license by HowGood (which disclaims liability or warranty for this information). If you have questions about a medical condition or this instruction, always ask your healthcare professional. [address] any warranty or liability for your use of this information. Fibromyalgia: Care Instructions Your Care Instructions Fibromyalgia is a painful condition that is not completely understood by medical experts. The cause of fibromyalgia is not known. It can make you feel tired and ache all over. It causes tender spots at specific points of the body that hurt only when you press on them. You may have trouble sleeping, as well as other symptoms. These problems can upset your work and home life. Symptoms tend to come and go, although they may never go away completely. Fibromyalgia does not harm your muscles, joints, or organs. Follow-up care is a key part of your treatment and safety. Be sure to make and go to all appointments, and call your doctor if you are having problems. It's also a good idea to know your test results and keep a list of the medicines you take. How can you care for yourself at home? · Exercise often. Walk, swim, or bike to help with pain and sleep problems and to make you feel better. · Try to get a good night's sleep. Go to bed and get up at the same time each day, whether you feel rested or not. Make sure you have a good mattress and pillow. · Reduce stress. Avoid things that cause you stress, if you can. If not, work at making them less stressful.  Learn to use biofeedback, guided imagery, meditation, or other methods to relax. · Make healthy changes. Eat a balanced diet, quit smoking, and limit alcohol and caffeine. · Use a heating pad set on low or take warm baths or showers for pain. Using cold packs for up to 20 minutes at a time can also relieve pain. Put a thin cloth between the cold pack and your skin. A gentle massage might help too. · Be safe with medicines. Take your medicines exactly as prescribed. Call your doctor if you think you are having a problem with your medicine. Your doctor may talk to you about taking antidepressant medicines. These medicines may improve sleep, relieve pain, and in some cases treat depression. · Learn about fibromyalgia. This makes coping easier. Then, take an active role in your treatment. · Think about joining a support group with others who have fibromyalgia to learn more and get support. When should you call for help? Watch closely for changes in your health, and be sure to contact your doctor if: 
? · You feel sad, helpless, or hopeless; lose interest in things you used to enjoy; or have other symptoms of depression. ? · Your fibromyalgia symptoms get worse. Where can you learn more? Go to http://alexanderHelios Towers Africacelena.info/. Enter V003 in the search box to learn more about \"Fibromyalgia: Care Instructions. \" Current as of: October 14, 2016 Content Version: 11.4 © 1393-9993 [employer]. Care instructions adapted under license by Engagio (which disclaims liability or warranty for this information). If you have questions about a medical condition or this instruction, always ask your healthcare professional. [address] [address] any warranty or liability for your use of this information. Introducing [facility]! Dear Tiana Ayala: Thank you for requesting a [employer] account. Our records indicate that you already have an active Iris [employer] account.   You can access your account anytime at [email or web address] Did you know that you can access your hospital and ER discharge instructions at any time in FAGUO? You can also review all of your test results from your hospital stay or ER visit. Additional Information If you have questions, please visit the Frequently Asked Questions section of the FAGUO website at [email or web address] Remember, FAGUO is NOT to be used for urgent needs. For medical emergencies, dial 911. Now available from your iPhone and Android! Please provide this summary of care documentation to your next provider. Your primary care clinician is listed as Nate Mcintosh. If you have any questions after today's visit, please call [phone].

## 2018-03-20 DIAGNOSIS — E55.9 VITAMIN D DEFICIENCY: Primary | ICD-10-CM

## 2018-03-20 LAB
25(OH)D3+25(OH)D2 SERPL-MCNC: 14.5 NG/ML (ref 30–100)
ALBUMIN SERPL-MCNC: 4.3 G/DL (ref 3.5–5.5)
ALBUMIN/GLOB SERPL: 1.5 {RATIO} (ref 1.2–2.2)
ALP SERPL-CCNC: 97 IU/L (ref 39–117)
ALT SERPL-CCNC: 22 IU/L (ref 0–32)
AST SERPL-CCNC: 20 IU/L (ref 0–40)
BILIRUB SERPL-MCNC: 1 MG/DL (ref 0–1.2)
BUN SERPL-MCNC: 12 MG/DL (ref 6–24)
BUN/CREAT SERPL: 13 (ref 9–23)
CALCIUM SERPL-MCNC: 9.4 MG/DL (ref 8.7–10.2)
CHLORIDE SERPL-SCNC: 100 MMOL/L (ref 96–106)
CO2 SERPL-SCNC: 22 MMOL/L (ref 18–29)
CREAT SERPL-MCNC: 0.89 MG/DL (ref 0.57–1)
ERYTHROCYTE [DISTWIDTH] IN BLOOD BY AUTOMATED COUNT: 13.9 % (ref 12.3–15.4)
EST. AVERAGE GLUCOSE BLD GHB EST-MCNC: 105 MG/DL
GFR SERPLBLD CREATININE-BSD FMLA CKD-EPI: 77 ML/MIN/1.73
GFR SERPLBLD CREATININE-BSD FMLA CKD-EPI: 89 ML/MIN/1.73
GLOBULIN SER CALC-MCNC: 2.8 G/DL (ref 1.5–4.5)
GLUCOSE SERPL-MCNC: 95 MG/DL (ref 65–99)
HBA1C MFR BLD: 5.3 % (ref 4.8–5.6)
HCT VFR BLD AUTO: 40.5 % (ref 34–46.6)
HGB BLD-MCNC: 13.6 G/DL (ref 11.1–15.9)
MCH RBC QN AUTO: 30.3 PG (ref 26.6–33)
MCHC RBC AUTO-ENTMCNC: 33.6 G/DL (ref 31.5–35.7)
MCV RBC AUTO: 90 FL (ref 79–97)
PLATELET # BLD AUTO: 265 X10E3/UL (ref 150–379)
POTASSIUM SERPL-SCNC: 4.5 MMOL/L (ref 3.5–5.2)
PROT SERPL-MCNC: 7.1 G/DL (ref 6–8.5)
RBC # BLD AUTO: 4.49 X10E6/UL (ref 3.77–5.28)
SODIUM SERPL-SCNC: 141 MMOL/L (ref 134–144)
TSH SERPL DL<=0.005 MIU/L-ACNC: 1.88 UIU/ML (ref 0.45–4.5)
WBC # BLD AUTO: 4.2 X10E3/UL (ref 3.4–10.8)

## 2018-03-20 RX ORDER — ERGOCALCIFEROL 1.25 MG/1
50000 CAPSULE ORAL
Qty: 12 CAP | Refills: 0 | Status: SHIPPED | OUTPATIENT
Start: 2018-03-20 | End: 2018-10-03 | Stop reason: ALTCHOICE

## 2018-03-21 ENCOUNTER — HOSPITAL ENCOUNTER (OUTPATIENT)
Dept: ULTRASOUND IMAGING | Age: 48
Discharge: HOME | End: 2018-03-21
Attending: FAMILY MEDICINE
Payer: COMMERCIAL

## 2018-03-21 DIAGNOSIS — R93.89 ABNORMAL THYROID ULTRASOUND: ICD-10-CM

## 2018-03-21 PROCEDURE — 76536 US EXAM OF HEAD AND NECK: CPT

## 2018-03-21 NOTE — PROGRESS NOTES
Contacted patient and verified identity using name and date of birth (2- identifiers)  Spoke with patient and she verbalized understanding of need for Rx Vit D for 12 weeks. Patient also aware of need to recheck Vit D level after she finishes Rx dose. Mailing lab order to patient.

## 2018-03-22 NOTE — PROGRESS NOTES
Contacted patient and verified identity using name and date of birth (2- identifiers)  Spoke with patient and advised of stable findings on US, keep appt with Endo once scheduled. Patient verbalized understanding.

## 2018-03-22 NOTE — PROGRESS NOTES
Please let pt know that thyroid ultrasound showed stable findings. Still recommended follow up yearly. Mean time will get endocrinologist recommendations. Please advise her to keep follow up with specialist. Further discussion on follow up visit. Thanks.

## 2018-04-05 ENCOUNTER — TELEPHONE (OUTPATIENT)
Dept: FAMILY MEDICINE CLINIC | Age: 48
End: 2018-04-05

## 2018-04-05 NOTE — TELEPHONE ENCOUNTER
Spoke with patient (all identifiers verified) to advise of upcoming endocrinology appointment with CHAYITO Chapa on 4/24/18 at 2:00 p.m. She was asked to arrive 30 minutes prior to the appointment for check-in and bring completed new patient packet (mailed by specialist), photo ID, insurance card, medication list and co-pay if applicable. Patient verbalized understanding. Office notes, labs and imaging has been faxed to CHAYITO Puckett, [phone]. A fax confirmation has been received.

## 2018-04-05 NOTE — TELEPHONE ENCOUNTER
Pt states that she was referred to Dr Dat Grayson (endo) but they can't get her it till July. She would ;dianna to know if there is anybody else that she go see. Pt has Alanana. Please advise.

## 2018-05-24 DIAGNOSIS — R53.82 CHRONIC FATIGUE: ICD-10-CM

## 2018-05-24 DIAGNOSIS — R52 PAIN OF MULTIPLE SITES: ICD-10-CM

## 2018-05-24 DIAGNOSIS — M79.7 FIBROMYALGIA: ICD-10-CM

## 2018-05-24 DIAGNOSIS — R23.2 HOT FLASHES: ICD-10-CM

## 2018-05-24 RX ORDER — DULOXETIN HYDROCHLORIDE 30 MG/1
CAPSULE, DELAYED RELEASE ORAL
Qty: 30 CAP | Refills: 1 | Status: SHIPPED | OUTPATIENT
Start: 2018-05-24 | End: 2018-07-20 | Stop reason: SDUPTHER

## 2018-06-08 ENCOUNTER — TELEPHONE (OUTPATIENT)
Dept: FAMILY MEDICINE CLINIC | Age: 48
End: 2018-06-08

## 2018-06-08 NOTE — TELEPHONE ENCOUNTER
Patient needs to see Dr. Trish Li because her referral to the endocrinologist was not a good experience, and would rather see Dr. Trish Li. She also needs to talk about her Cymbalta strength. She also is having new pain symptoms. She is scheduled for June 9th @ 1:45;She would like to get in sooner if available. I scheduled her for the first available appointment.

## 2018-06-11 NOTE — TELEPHONE ENCOUNTER
Dr. Mariola Shelley please advise, thank you. Also, routing appointment request to [facility], Dr. Clif Jaramillo office.

## 2018-06-12 NOTE — TELEPHONE ENCOUNTER
Spoke with patient (all identifiers verified) to advise Dr. Yajaira Lawton would like to see her in the office to discuss endo referral and med eval for cymbalta. Patient verbalized understanding; scheduled consult with Dr. Yajaira Lawton on 6/18/18 at 10:00A.

## 2018-06-18 DIAGNOSIS — E55.9 VITAMIN D DEFICIENCY: ICD-10-CM

## 2018-06-18 RX ORDER — ERGOCALCIFEROL 1.25 MG/1
CAPSULE ORAL
Qty: 12 CAP | Refills: 0 | OUTPATIENT
Start: 2018-06-18

## 2018-06-18 NOTE — TELEPHONE ENCOUNTER
Pt needs to do lab before need for refill of medication. Further recommendations depend on lab result.

## 2018-07-09 NOTE — TELEPHONE ENCOUNTER
Closing encounter. Patient has cancelled/no showed to 5 appointments since her initial appt with Dr. Michela Peña.

## 2018-07-20 DIAGNOSIS — R53.82 CHRONIC FATIGUE: ICD-10-CM

## 2018-07-20 DIAGNOSIS — M79.7 FIBROMYALGIA: ICD-10-CM

## 2018-07-20 DIAGNOSIS — R52 PAIN OF MULTIPLE SITES: ICD-10-CM

## 2018-07-20 DIAGNOSIS — R23.2 HOT FLASHES: ICD-10-CM

## 2018-07-24 NOTE — TELEPHONE ENCOUNTER
Called patient and left message at home number listed to call office regarding Rx request. Needs appointment. Tried to reach on mobile and voicemail box is full, unable to leave a message.

## 2018-07-31 RX ORDER — DULOXETIN HYDROCHLORIDE 30 MG/1
CAPSULE, DELAYED RELEASE ORAL
Qty: 30 CAP | Refills: 1 | Status: SHIPPED | OUTPATIENT
Start: 2018-07-31 | End: 2018-08-24 | Stop reason: SDUPTHER

## 2018-07-31 NOTE — TELEPHONE ENCOUNTER
Please try to reach out pt and send a letter as well. If no response in a month need to send a d/c letter as she has twice no showed and one cancel appt. Thanks. For now giving one month supply.

## 2018-08-02 NOTE — TELEPHONE ENCOUNTER
Called patient and left message to please call office regarding Rx request. I did mention the importance of returning call.

## 2018-08-24 ENCOUNTER — OFFICE VISIT (OUTPATIENT)
Dept: FAMILY MEDICINE CLINIC | Age: 48
End: 2018-08-24

## 2018-08-24 VITALS
DIASTOLIC BLOOD PRESSURE: 88 MMHG | SYSTOLIC BLOOD PRESSURE: 120 MMHG | HEIGHT: 67 IN | TEMPERATURE: 97.9 F | BODY MASS INDEX: 34.47 KG/M2 | RESPIRATION RATE: 16 BRPM | OXYGEN SATURATION: 98 % | WEIGHT: 219.6 LBS | HEART RATE: 71 BPM

## 2018-08-24 DIAGNOSIS — E55.9 VITAMIN D DEFICIENCY: ICD-10-CM

## 2018-08-24 DIAGNOSIS — R45.86 MOOD CHANGES: ICD-10-CM

## 2018-08-24 DIAGNOSIS — R23.2 HOT FLASHES: ICD-10-CM

## 2018-08-24 DIAGNOSIS — R10.11 RIGHT UPPER QUADRANT ABDOMINAL PAIN: ICD-10-CM

## 2018-08-24 DIAGNOSIS — R53.82 CHRONIC FATIGUE: Primary | ICD-10-CM

## 2018-08-24 DIAGNOSIS — R52 PAIN OF MULTIPLE SITES: ICD-10-CM

## 2018-08-24 DIAGNOSIS — M79.7 FIBROMYALGIA: ICD-10-CM

## 2018-08-24 DIAGNOSIS — G47.9 SLEEP TROUBLE: ICD-10-CM

## 2018-08-24 DIAGNOSIS — C56.9 OVARIAN CARCINOMA, UNSPECIFIED LATERALITY (HCC): ICD-10-CM

## 2018-08-24 RX ORDER — DULOXETIN HYDROCHLORIDE 60 MG/1
60 CAPSULE, DELAYED RELEASE ORAL DAILY
Qty: 30 CAP | Refills: 1 | Status: SHIPPED | OUTPATIENT
Start: 2018-08-24 | End: 2018-10-19 | Stop reason: SDUPTHER

## 2018-08-24 RX ORDER — IBUPROFEN 200 MG
600 TABLET ORAL
COMMUNITY

## 2018-08-24 RX ORDER — TRAMADOL HYDROCHLORIDE 50 MG/1
50 TABLET ORAL
COMMUNITY
Start: 2018-07-16

## 2018-08-24 NOTE — PATIENT INSTRUCTIONS
[id number]       Fatigue: Care Instructions  Your Care Instructions    Fatigue is a feeling of tiredness, exhaustion, or lack of energy. You may feel fatigue because of too much or not enough activity. It can also come from stress, lack of sleep, boredom, and poor diet. Many medical problems, such as viral infections, can cause fatigue. Emotional problems, especially depression, are often the cause of fatigue. Fatigue is most often a symptom of another problem. Treatment for fatigue depends on the cause. For example, if you have fatigue because you have a certain health problem, treating this problem also treats your fatigue. If depression or anxiety is the cause, treatment may help. Follow-up care is a key part of your treatment and safety. Be sure to make and go to all appointments, and call your doctor if you are having problems. It's also a good idea to know your test results and keep a list of the medicines you take. How can you care for yourself at home? · Get regular exercise. But don't overdo it. Go back and forth between rest and exercise. · Get plenty of rest.  · Eat a healthy diet. Do not skip meals, especially breakfast.  · Reduce your use of caffeine, tobacco, and alcohol. Caffeine is most often found in coffee, tea, cola drinks, and chocolate. · Limit medicines that can cause fatigue. This includes tranquilizers and cold and allergy medicines. When should you call for help? Watch closely for changes in your health, and be sure to contact your doctor if:    · You have new symptoms such as fever or a rash.     · Your fatigue gets worse.     · You have been feeling down, depressed, or hopeless. Or you may have lost interest in things that you usually enjoy.     · You are not getting better as expected. Where can you learn more? Go to http://alexander-celena.info/. Enter K902 in the search box to learn more about \"Fatigue: Care Instructions. \"  Current as of: November 20, 2017  Content Version: 11.7  © 2607-2782 [employer]. Care instructions adapted under license by [employer] (which disclaims liability or warranty for this information). If you have questions about a medical condition or this instruction, always ask your healthcare professional. [address] any warranty or liability for your use of this information. Fibromyalgia: Care Instructions  Your Care Instructions    Fibromyalgia is a painful condition that is not completely understood by medical experts. The cause of fibromyalgia is not known. It can make you feel tired and ache all over. It causes tender spots at specific points of the body that hurt only when you press on them. You may have trouble sleeping, as well as other symptoms. These problems can upset your work and home life. Symptoms tend to come and go, although they may never go away completely. Fibromyalgia does not harm your muscles, joints, or organs. Follow-up care is a key part of your treatment and safety. Be sure to make and go to all appointments, and call your doctor if you are having problems. It's also a good idea to know your test results and keep a list of the medicines you take. How can you care for yourself at home? · Exercise often. Walk, swim, or bike to help with pain and sleep problems and to make you feel better. · Try to get a good night's sleep. Go to bed and get up at the same time each day, whether you feel rested or not. Make sure you have a good mattress and pillow. · Reduce stress. Avoid things that cause you stress, if you can. If not, work at making them less stressful. Learn to use biofeedback, guided imagery, meditation, or other methods to relax. · Make healthy changes. Eat a balanced diet, quit smoking, and limit alcohol and caffeine. · Use a heating pad set on low or take warm baths or showers for pain.  Using cold packs for up to 20 minutes at a time can also relieve pain. Put a thin cloth between the cold pack and your skin. A gentle massage might help too. · Be safe with medicines. Take your medicines exactly as prescribed. Call your doctor if you think you are having a problem with your medicine. Your doctor may talk to you about taking antidepressant medicines. These medicines may improve sleep, relieve pain, and in some cases treat depression. · Learn about fibromyalgia. This makes coping easier. Then, take an active role in your treatment. · Think about joining a support group with others who have fibromyalgia to learn more and get support. When should you call for help? Watch closely for changes in your health, and be sure to contact your doctor if:    · You feel sad, helpless, or hopeless; lose interest in things you used to enjoy; or have other symptoms of depression.     · Your fibromyalgia symptoms get worse. Where can you learn more? Go to http://alexanderHypePointscelena.info/. Enter V003 in the search box to learn more about \"Fibromyalgia: Care Instructions. \"  Current as of: October 9, 2017  Content Version: 11.7  © 1880-5109 [employer]. Care instructions adapted under license by [employer] (which disclaims liability or warranty for this information). If you have questions about a medical condition or this instruction, always ask your healthcare professional. [address] any warranty or liability for your use of this information. Learning About Acute Cholecystitis  What is cholecystitis? Cholecystitis (say \"koh-lih-sis-TY-tus\") is inflammation of the gallbladder. The gallbladder stores bile. Bile helps the body digest food. Normally, the bile flows from the gallbladder to the small intestine. A gallstone stuck in the cystic duct is most often the cause of sudden (acute) cholecystitis. The cystic duct is the tube that carries the bile out of the gallbladder.  The gallstone blocks the bile from leaving the gallbladder. This results in an irritated and swollen gallbladder. The disease can also be caused by infection or trauma, such as an injury from a car accident. Cholecystitis has to be treated right away. You will probably have to go to the hospital. Surgery is the usual treatment. What are the symptoms? Symptoms include:  · Steady and severe pain in the upper right part of belly. This is the most common symptom. The pain can sometimes move to your back or right shoulder blade. It may last for more than 6 hours. · Nausea or vomiting. · A fever. How is it treated? The main way to treat this disease is surgery to remove the gallbladder. This surgery can often be done through small cuts (incisions) in the belly. This is called a laparoscopic cholecystectomy. In some cases, you may need a more extensive surgery. You may need surgery as soon as possible. The doctor may try to reduce swelling and irritation in the gallbladder before removing it. You may be given fluids and antibiotics through an IV. You may also be given pain medicine. Follow-up care is a key part of your treatment and safety. Be sure to make and go to all appointments, and call your doctor if you are having problems. It's also a good idea to know your test results and keep a list of the medicines you take. Where can you learn more? Go to http://alexander-celena.info/. Enter T940 in the search box to learn more about \"Learning About Acute Cholecystitis. \"  Current as of: May 12, 2017  Content Version: 11.7  © 8067-7485 [employer]. Care instructions adapted under license by [employer] (which disclaims liability or warranty for this information). If you have questions about a medical condition or this instruction, always ask your healthcare professional. [place] [address] any warranty or liability for your use of this information.        Eating Healthy Foods: Care Instructions  Your Care Instructions    Eating healthy foods can help lower your risk for disease. Healthy food gives you energy and keeps your heart strong, your brain active, your muscles working, and your bones strong. A healthy diet includes a variety of foods from the basic food groups: grains, vegetables, fruits, milk and milk products, and meat and beans. Some people may eat more of their favorite foods from only one food group and, as a result, miss getting the nutrients they need. So, it is important to pay attention not only to what you eat but also to what you are missing from your diet. You can eat a healthy, balanced diet by making a few small changes. Follow-up care is a key part of your treatment and safety. Be sure to make and go to all appointments, and call your doctor if you are having problems. It's also a good idea to know your test results and keep a list of the medicines you take. How can you care for yourself at home? Look at what you eat  · Keep a food diary for a week or two and record everything you eat or drink. Track the number of servings you eat from each food group. · For a balanced diet every day, eat a variety of:  ¨ 6 or more ounce-equivalents of grains, such as cereals, breads, crackers, rice, or pasta, every day. An ounce-equivalent is 1 slice of bread, 1 cup of ready-to-eat cereal, or ½ cup of cooked rice, cooked pasta, or cooked cereal.  ¨ 2½ cups of vegetables, especially:  § Dark-green vegetables such as broccoli and spinach. § Orange vegetables such as carrots and sweet potatoes. § Dry beans (such as ledezma and kidney beans) and peas (such as lentils). ¨ 2 cups of fresh, frozen, or canned fruit. A small apple or 1 banana or orange equals 1 cup. ¨ 3 cups of nonfat or low-fat milk, yogurt, or other milk products. ¨ 5½ ounces of meat and beans, such as chicken, fish, lean meat, beans, nuts, and seeds.  One egg, 1 tablespoon of peanut butter, ½ ounce nuts or seeds, or ¼ cup of cooked beans equals 1 ounce of meat. · Learn how to read food labels for serving sizes and ingredients. Fast-food and convenience-food meals often contain few or no fruits or vegetables. Make sure you eat some fruits and vegetables to make the meal more nutritious. · Look at your food diary. For each food group, add up what you have eaten and then divide the total by the number of days. This will give you an idea of how much you are eating from each food group. See if you can find some ways to change your diet to make it more healthy. Start small  · Do not try to make dramatic changes to your diet all at once. You might feel that you are missing out on your favorite foods and then be more likely to fail. · Start slowly, and gradually change your habits. Try some of the following:  ¨ Use whole wheat bread instead of white bread. ¨ Use nonfat or low-fat milk instead of whole milk. ¨ Eat brown rice instead of white rice, and eat whole wheat pasta instead of white-flour pasta. ¨ Try low-fat cheeses and low-fat yogurt. ¨ Add more fruits and vegetables to meals and have them for snacks. ¨ Add lettuce, tomato, cucumber, and onion to sandwiches. ¨ Add fruit to yogurt and cereal.  Enjoy food  · You can still eat your favorite foods. You just may need to eat less of them. If your favorite foods are high in fat, salt, and sugar, limit how often you eat them, but do not cut them out entirely. · Eat a wide variety of foods. Make healthy choices when eating out  · The type of restaurant you choose can help you make healthy choices. Even fast-food chains are now offering more low-fat or healthier choices on the menu. · Choose smaller portions, or take half of your meal home. · When eating out, try:  ¨ A veggie pizza with a whole wheat crust or grilled chicken (instead of sausage or pepperoni). ¨ Pasta with roasted vegetables, grilled chicken, or marinara sauce instead of cream sauce.   ¨ A vegetable wrap or grilled chicken wrap. ¨ Broiled or poached food instead of fried or breaded items. Make healthy choices easy  · Buy packaged, prewashed, ready-to-eat fresh vegetables and fruits, such as baby carrots, salad mixes, and chopped or shredded broccoli and cauliflower. · Buy packaged, presliced fruits, such as melon or pineapple. · Choose 100% fruit or vegetable juice instead of soda. Limit juice intake to 4 to 6 oz (½ to ¾ cup) a day. · Blend low-fat yogurt, fruit juice, and canned or frozen fruit to make a smoothie for breakfast or a snack. Where can you learn more? Go to http://alexander-celena.info/. Enter T756 in the search box to learn more about \"Eating Healthy Foods: Care Instructions. \"  Current as of: May 12, 2017  Content Version: 11.7  © 4465-0893 [employer]. Care instructions adapted under license by [employer] (which disclaims liability or warranty for this information). If you have questions about a medical condition or this instruction, always ask your healthcare professional. [place] [address] any warranty or liability for your use of this information. Learning About Physical Activity  What is physical activity? Physical activity is any kind of activity that gets your body moving. The types of physical activity that can help you get fit and stay healthy include:  · Aerobic or \"cardio\" activities that make your heart beat faster and make you breathe harder, such as brisk walking, riding a bike, or running. Aerobic activities strengthen your heart and lungs and build up your endurance. · Strength training activities that make your muscles work against, or \"resist,\" something, such as lifting weights or doing push-ups. These activities help tone and strengthen your muscles. · Stretches that allow you to move your joints and muscles through their full range of motion. Stretching helps you be more flexible and avoid injury.   What are the benefits of physical activity? Being active is one of the best things you can do to get fit and stay healthy. It helps you to:  · Feel stronger and have more energy to do all the things you like to do. · Focus better at school or work and perform better in sports. · Feel, think, and sleep better. · Reach and stay at a healthy weight. · Lose fat and build lean muscle. · Lower your risk for serious health problems. · Keep your bones, muscles, and joints strong. Being fit lets you do more physical activity. And it lets you work out harder without as much effort. How can you make physical activity part of your life? Get at least 30 minutes of exercise on most days of the week. Walking is a good choice. You also may want to do other activities, such as running, swimming, cycling, or playing tennis or team sports. Pick activities that you like-ones that make your heart beat faster, your muscles stronger, and your muscles and joints more flexible. If you find more than one thing you like doing, do them all. You don't have to do the same thing every day. Get your heart pumping every day. Any activity that makes your heart beat faster and keeps it at that rate for a while counts. Here are some great ways to get your heart beating faster:  · Go for a brisk walk, run, or bike ride. · Go for a hike or swim. · Go in-line skating. · Play a game of touch football, basketball, or soccer. · Ride a bike. · Play tennis or racquetball. · Climb stairs. Even some household chores can be aerobic-just do them at a faster pace. Vacuuming, raking or mowing the lawn, sweeping the garage, and washing and waxing the car all can help get your heart rate up. Strengthen your muscles during the week. You don't have to lift heavy weights or grow big, bulky muscles to get stronger. Doing a few simple activities that make your muscles work against, or \"resist,\" something can help you get stronger.   For example, you can:  · Do push-ups or sit-ups, which use your own body weight as resistance. · Lift weights or dumbbells or use stretch bands at home or in a gym or community center. Stretch your muscles often. Stretching will help you as you become more active. It can help you stay flexible, loosen tight muscles, and avoid injury. It can also help improve your balance and posture and can be a great way to relax. Be sure to stretch the muscles you'll be using when you work out. It's best to warm your muscles slightly before you stretch them. Walk or do some other light aerobic activity for a few minutes, and then start stretching. When you stretch your muscles:  · Do it slowly. Stretching is not about going fast or making sudden movements. · Don't push or bounce during a stretch. · Hold each stretch for at least 15 to 30 seconds, if you can. You should feel a stretch in the muscle, but not pain. · Breathe out as you do the stretch. Then breathe in as you hold the stretch. Don't hold your breath. If you're worried about how more activity might affect your health, have a checkup before you start. Follow any special advice your doctor gives you for getting a smart start. Where can you learn more? Go to http://alexander-celena.info/. Enter S374 in the search box to learn more about \"Learning About Physical Activity. \"  Current as of: December 7, 2017  Content Version: 11.7  © 4658-3436 [employer]. Care instructions adapted under license by [facility] (which disclaims liability or warranty for this information). If you have questions about a medical condition or this instruction, always ask your healthcare professional. [address] any warranty or liability for your use of this information. Learning About Sleeping Well  What does sleeping well mean? Sleeping well means getting enough sleep. How much sleep is enough varies among people.   The number of hours you sleep is not as important as how you feel when you wake up. If you do not feel refreshed, you probably need more sleep. Another sign of not getting enough sleep is feeling tired during the day. The average total nightly sleep time is 7½ to 8 hours. Healthy adults may need a little more or a little less than this. Why is getting enough sleep important? Getting enough quality sleep is a basic part of good health. When your sleep suffers, your mood and your thoughts can suffer too. You may find yourself feeling more grumpy or stressed. Not getting enough sleep also can lead to serious problems, including injury, accidents, anxiety, and depression. What might cause poor sleeping? Many things can cause sleep problems, including:  · Stress. Stress can be caused by fear about a single event, such as giving a speech. Or you may have ongoing stress, such as worry about work or school. · Depression, anxiety, and other mental or emotional conditions. · Changes in your sleep habits or surroundings. This includes changes that happen where you sleep, such as noise, light, or sleeping in a different bed. It also includes changes in your sleep pattern, such as having jet lag or working a late shift. · Health problems, such as pain, breathing problems, and restless legs syndrome. · Lack of regular exercise. How can you help yourself? Here are some tips that may help you sleep more soundly and wake up feeling more refreshed. Your sleeping area  · Use your bedroom only for sleeping and sex. A bit of light reading may help you fall asleep. But if it doesn't, do your reading elsewhere in the house. Don't watch TV in bed. · Be sure your bed is big enough to stretch out comfortably, especially if you have a sleep partner. · Keep your bedroom quiet, dark, and cool. Use curtains, blinds, or a sleep mask to block out light. To block out noise, use earplugs, soothing music, or a \"white noise\" machine.   Your evening and bedtime routine  · Create a relaxing bedtime routine. You might want to take a warm shower or bath, listen to soothing music, or drink a cup of noncaffeinated tea. · Go to bed at the same time every night. And get up at the same time every morning, even if you feel tired. What to avoid  · Limit caffeine (coffee, tea, caffeinated sodas) during the day, and don't have any for at least 4 to 6 hours before bedtime. · Don't drink alcohol before bedtime. Alcohol can cause you to wake up more often during the night. · Don't smoke or use tobacco, especially in the evening. Nicotine can keep you awake. · Don't take naps during the day, especially close to bedtime. · Don't lie in bed awake for too long. If you can't fall asleep, or if you wake up in the middle of the night and can't get back to sleep within 15 minutes or so, get out of bed and go to another room until you feel sleepy. · Don't take medicine right before bed that may keep you awake or make you feel hyper or energized. Your doctor can tell you if your medicine may do this and if you can take it earlier in the day. If you can't sleep  · Imagine yourself in a peaceful, pleasant scene. Focus on the details and feelings of being in a place that is relaxing. · Get up and do a quiet or boring activity until you feel sleepy. · Don't drink any liquids after 6 p.m. if you wake up often because you have to go to the bathroom. Where can you learn more? Go to http://alexander-celena.info/. Enter N402 in the search box to learn more about \"Learning About Sleeping Well. \"  Current as of: December 7, 2017  Content Version: 11.7  © 2592-5397 [employer]. Care instructions adapted under license by [employer] (which disclaims liability or warranty for this information).  If you have questions about a medical condition or this instruction, always ask your healthcare professional. Jennifer Leon disclaims any warranty or liability for your use of this information.

## 2018-08-24 NOTE — MR AVS SNAPSHOT
[address] [facility] [address] [facility] 
[phone] Patient: Lowell Rivas MRN: [id number] SXW:[id number] Visit Information Date & Time Provider Department Dept. Phone Encounter #  
 8/24/2018 11:00 AM Dylan Grace, [address] [facility] [phone] [id number] Follow-up Instructions Return in about 1 month (around 9/24/2018). Your Appointments 9/6/2018  9:00 AM  
New Patient with Carolyn Matias MD  
[facility] - SPECIALTY [facility] ([address]) Appt Note: back pain has mri pt to bring [place] [address] [facility] [employer] [place] [id number] [facility] Upcoming Health Maintenance Date Due Pneumococcal 19-64 Medium Risk (1 of 1 - PPSV23) 6/18/1989 Influenza Age 5 to Adult 8/1/2018 DTaP/Tdap/Td series (2 - Td) 8/22/2027 Allergies as of 8/24/2018  Review Complete On: 8/24/2018 By: Idella Closs No Known Allergies Current Immunizations  Reviewed on 3/11/2016 Name Date Influenza Vaccine (Quad) PF 10/13/2016  2:49 PM  
  
 Not reviewed this visit You Were Diagnosed With   
  
 Codes Comments Chronic fatigue    -  Primary ICD-10-CM: R53.82 
ICD-9-CM: 780.79 Fibromyalgia     ICD-10-CM: M79.7 ICD-9-CM: 729.1 Hot flashes     ICD-10-CM: R23.2 ICD-9-CM: 782.62 Pain of multiple sites     ICD-10-CM: R52 ICD-9-CM: 780.96 BMI 34.0-34.9,adult     ICD-10-CM: Y22.07 
ICD-9-CM: V85.34 Mood changes (HCC)     ICD-10-CM: F39 
ICD-9-CM: 296.90 has an appt for pyschiatrist   
 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 following oncology. Right upper quadrant abdominal pain     ICD-10-CM: R10.11 ICD-9-CM: 789.01 Vitamin D deficiency     ICD-10-CM: E55.9 ICD-9-CM: 268.9 Sleep trouble     ICD-10-CM: G47.9 ICD-9-CM: 780.50 Vitals BP Pulse Temp Resp Height(growth percentile) Weight(growth percentile) 120/88 (BP 1 Location: Left arm, BP Patient Position: Sitting) 71 97.9 °F (36.6 °C) (Oral) 16 5' 7\" (1.702 m) 219 lb 9.6 oz (99.6 kg) LMP SpO2 BMI OB Status Smoking Status 08/02/2016 98% 34.39 kg/m2 Hysterectomy Former Smoker Vitals History BMI and BSA Data Body Mass Index Body Surface Area  
 34.39 kg/m 2 2.17 m 2 Preferred Pharmacy Pharmacy Name Phone [facility]Y #[id number]- [address], [address] [place] [phone] Your Updated Medication List  
  
   
This list is accurate as of 8/24/18 11:51 AM.  Always use your most recent med list.  
  
  
  
  
 Angie Pizza EXTRACT(BULK) 800 mg by Does Not Apply route daily. DULoxetine 60 mg capsule Commonly known as:  CYMBALTA Take 1 Cap by mouth daily. ergocalciferol 50,000 unit capsule Commonly known as:  DRISDOL Take 1 Cap by mouth every seven (7) days. GLUCOSAMINE-CHONDR (BOSWELLIA) PO Take 1,000 mg by mouth two (2) times a day. ibuprofen 200 mg tablet Commonly known as:  MOTRIN Take 600 mg by mouth every six (6) hours as needed for Pain. OTHER Take 500 mg by mouth daily. Tumeric  
  
 traMADol 50 mg tablet Commonly known as:  ULTRAM  
50 mg.  
  
  
  
  
Prescriptions Sent to Pharmacy Refills DULoxetine (CYMBALTA) 60 mg capsule 1 Sig: Take 1 Cap by mouth daily. Class: Normal  
 [facility] [address] [place] [address] #: [phone] Route: Oral  
  
We Performed the Following REFERRAL TO NUTRITION [REF50 Custom] Follow-up Instructions Return in about 1 month (around 9/24/2018). To-Do List   
 08/24/2018 Imaging:  US GALLBLADDER Referral Information  Referral ID Referred By Referred To  
  
 [id number] [facility], [address]  [facility] OP NUTRITION   
 [place], [address] Visits Status Start Date End Date 1 New Request 8/24/18 8/24/19 If your referral has a status of pending review or denied, additional information will be sent to support the outcome of this decision. Patient Instructions [id number] Fatigue: Care Instructions Your Care Instructions Fatigue is a feeling of tiredness, exhaustion, or lack of energy. You may feel fatigue because of too much or not enough activity. It can also come from stress, lack of sleep, boredom, and poor diet. Many medical problems, such as viral infections, can cause fatigue. Emotional problems, especially depression, are often the cause of fatigue. Fatigue is most often a symptom of another problem. Treatment for fatigue depends on the cause. For example, if you have fatigue because you have a certain health problem, treating this problem also treats your fatigue. If depression or anxiety is the cause, treatment may help. Follow-up care is a key part of your treatment and safety. Be sure to make and go to all appointments, and call your doctor if you are having problems. It's also a good idea to know your test results and keep a list of the medicines you take. How can you care for yourself at home? · Get regular exercise. But don't overdo it. Go back and forth between rest and exercise. · Get plenty of rest. 
· Eat a healthy diet. Do not skip meals, especially breakfast. 
· Reduce your use of caffeine, tobacco, and alcohol. Caffeine is most often found in coffee, tea, cola drinks, and chocolate. · Limit medicines that can cause fatigue. This includes tranquilizers and cold and allergy medicines. When should you call for help? Watch closely for changes in your health, and be sure to contact your doctor if: 
  · You have new symptoms such as fever or a rash.  
  · Your fatigue gets worse.  
  · You have been feeling down, depressed, or hopeless.  Or you may have lost interest in things that you usually enjoy.  
  · You are not getting better as expected. Where can you learn more? Go to http://alexander-celena.info/. Enter J145 in the search box to learn more about \"Fatigue: Care Instructions. \" Current as of: November 20, 2017 Content Version: 11.7 © 2436-0027 Fluidnet. Care instructions adapted under license by Orckestra (which disclaims liability or warranty for this information). If you have questions about a medical condition or this instruction, always ask your healthcare professional. [address] any warranty or liability for your use of this information. Fibromyalgia: Care Instructions Your Care Instructions Fibromyalgia is a painful condition that is not completely understood by medical experts. The cause of fibromyalgia is not known. It can make you feel tired and ache all over. It causes tender spots at specific points of the body that hurt only when you press on them. You may have trouble sleeping, as well as other symptoms. These problems can upset your work and home life. Symptoms tend to come and go, although they may never go away completely. Fibromyalgia does not harm your muscles, joints, or organs. Follow-up care is a key part of your treatment and safety. Be sure to make and go to all appointments, and call your doctor if you are having problems. It's also a good idea to know your test results and keep a list of the medicines you take. How can you care for yourself at home? · Exercise often. Walk, swim, or bike to help with pain and sleep problems and to make you feel better. · Try to get a good night's sleep. Go to bed and get up at the same time each day, whether you feel rested or not. Make sure you have a good mattress and pillow. · Reduce stress. Avoid things that cause you stress, if you can.  If not, work at making them less stressful. Learn to use biofeedback, guided imagery, meditation, or other methods to relax. · Make healthy changes. Eat a balanced diet, quit smoking, and limit alcohol and caffeine. · Use a heating pad set on low or take warm baths or showers for pain. Using cold packs for up to 20 minutes at a time can also relieve pain. Put a thin cloth between the cold pack and your skin. A gentle massage might help too. · Be safe with medicines. Take your medicines exactly as prescribed. Call your doctor if you think you are having a problem with your medicine. Your doctor may talk to you about taking antidepressant medicines. These medicines may improve sleep, relieve pain, and in some cases treat depression. · Learn about fibromyalgia. This makes coping easier. Then, take an active role in your treatment. · Think about joining a support group with others who have fibromyalgia to learn more and get support. When should you call for help? Watch closely for changes in your health, and be sure to contact your doctor if: 
  · You feel sad, helpless, or hopeless; lose interest in things you used to enjoy; or have other symptoms of depression.  
  · Your fibromyalgia symptoms get worse. Where can you learn more? Go to http://alexander-celena.info/. Enter V003 in the search box to learn more about \"Fibromyalgia: Care Instructions. \" Current as of: October 9, 2017 Content Version: 11.7 © 1642-2807 WaterplayUSA. Care instructions adapted under license by [employer]UB (which disclaims liability or warranty for this information). If you have questions about a medical condition or this instruction, always ask your healthcare professional. [place] [address] any warranty or liability for your use of this information. Learning About Acute Cholecystitis What is cholecystitis?  
 
Cholecystitis (say \"koh-lih-sis-TY-tus\") is inflammation of the gallbladder. The gallbladder stores bile. Bile helps the body digest food. Normally, the bile flows from the gallbladder to the small intestine. A gallstone stuck in the cystic duct is most often the cause of sudden (acute) cholecystitis. The cystic duct is the tube that carries the bile out of the gallbladder. The gallstone blocks the bile from leaving the gallbladder. This results in an irritated and swollen gallbladder. The disease can also be caused by infection or trauma, such as an injury from a car accident. Cholecystitis has to be treated right away. You will probably have to go to the hospital. Surgery is the usual treatment. What are the symptoms? Symptoms include: · Steady and severe pain in the upper right part of belly. This is the most common symptom. The pain can sometimes move to your back or right shoulder blade. It may last for more than 6 hours. · Nausea or vomiting. · A fever. How is it treated? The main way to treat this disease is surgery to remove the gallbladder. This surgery can often be done through small cuts (incisions) in the belly. This is called a laparoscopic cholecystectomy. In some cases, you may need a more extensive surgery. You may need surgery as soon as possible. The doctor may try to reduce swelling and irritation in the gallbladder before removing it. You may be given fluids and antibiotics through an IV. You may also be given pain medicine. Follow-up care is a key part of your treatment and safety. Be sure to make and go to all appointments, and call your doctor if you are having problems. It's also a good idea to know your test results and keep a list of the medicines you take. Where can you learn more? Go to http://alexander-celena.info/. Enter T940 in the search box to learn more about \"Learning About Acute Cholecystitis. \" Current as of: May 12, 2017 Content Version: 11.7 © 9899-7653 Healthwise, ScanSafe. Care instructions adapted under license by Happy Studio (which disclaims liability or warranty for this information). If you have questions about a medical condition or this instruction, always ask your healthcare professional. [address] any warranty or liability for your use of this information. Eating Healthy Foods: Care Instructions Your Care Instructions Eating healthy foods can help lower your risk for disease. Healthy food gives you energy and keeps your heart strong, your brain active, your muscles working, and your bones strong. A healthy diet includes a variety of foods from the basic food groups: grains, vegetables, fruits, milk and milk products, and meat and beans. Some people may eat more of their favorite foods from only one food group and, as a result, miss getting the nutrients they need. So, it is important to pay attention not only to what you eat but also to what you are missing from your diet. You can eat a healthy, balanced diet by making a few small changes. Follow-up care is a key part of your treatment and safety. Be sure to make and go to all appointments, and call your doctor if you are having problems. It's also a good idea to know your test results and keep a list of the medicines you take. How can you care for yourself at home? Look at what you eat · Keep a food diary for a week or two and record everything you eat or drink. Track the number of servings you eat from each food group. · For a balanced diet every day, eat a variety of: ¨ 6 or more ounce-equivalents of grains, such as cereals, breads, crackers, rice, or pasta, every day. An ounce-equivalent is 1 slice of bread, 1 cup of ready-to-eat cereal, or ½ cup of cooked rice, cooked pasta, or cooked cereal. 
¨ 2½ cups of vegetables, especially: § Dark-green vegetables such as broccoli and spinach. § Orange vegetables such as carrots and sweet potatoes. § Dry beans (such as ledezma and kidney beans) and peas (such as lentils). ¨ 2 cups of fresh, frozen, or canned fruit. A small apple or 1 banana or orange equals 1 cup. ¨ 3 cups of nonfat or low-fat milk, yogurt, or other milk products. ¨ 5½ ounces of meat and beans, such as chicken, fish, lean meat, beans, nuts, and seeds. One egg, 1 tablespoon of peanut butter, ½ ounce nuts or seeds, or ¼ cup of cooked beans equals 1 ounce of meat. · Learn how to read food labels for serving sizes and ingredients. Fast-food and convenience-food meals often contain few or no fruits or vegetables. Make sure you eat some fruits and vegetables to make the meal more nutritious. · Look at your food diary. For each food group, add up what you have eaten and then divide the total by the number of days. This will give you an idea of how much you are eating from each food group. See if you can find some ways to change your diet to make it more healthy. Start small · Do not try to make dramatic changes to your diet all at once. You might feel that you are missing out on your favorite foods and then be more likely to fail. · Start slowly, and gradually change your habits. Try some of the following: ¨ Use whole wheat bread instead of white bread. ¨ Use nonfat or low-fat milk instead of whole milk. ¨ Eat brown rice instead of white rice, and eat whole wheat pasta instead of white-flour pasta. ¨ Try low-fat cheeses and low-fat yogurt. ¨ Add more fruits and vegetables to meals and have them for snacks. ¨ Add lettuce, tomato, cucumber, and onion to sandwiches. ¨ Add fruit to yogurt and cereal. 
Enjoy food · You can still eat your favorite foods. You just may need to eat less of them. If your favorite foods are high in fat, salt, and sugar, limit how often you eat them, but do not cut them out entirely. · Eat a wide variety of foods. Make healthy choices when eating out · The type of restaurant you choose can help you make healthy choices. Even fast-food chains are now offering more low-fat or healthier choices on the menu. · Choose smaller portions, or take half of your meal home. · When eating out, try: ¨ A veggie pizza with a whole wheat crust or grilled chicken (instead of sausage or pepperoni). ¨ Pasta with roasted vegetables, grilled chicken, or marinara sauce instead of cream sauce. ¨ A vegetable wrap or grilled chicken wrap. ¨ Broiled or poached food instead of fried or breaded items. Make healthy choices easy · Buy packaged, prewashed, ready-to-eat fresh vegetables and fruits, such as baby carrots, salad mixes, and chopped or shredded broccoli and cauliflower. · Buy packaged, presliced fruits, such as melon or pineapple. · Choose 100% fruit or vegetable juice instead of soda. Limit juice intake to 4 to 6 oz (½ to ¾ cup) a day. · Blend low-fat yogurt, fruit juice, and canned or frozen fruit to make a smoothie for breakfast or a snack. Where can you learn more? Go to http://alexanderBuzzSpicecelena.info/. Enter T756 in the search box to learn more about \"Eating Healthy Foods: Care Instructions. \" Current as of: May 12, 2017 Content Version: 11.7 © 4144-4511 DocOnYou. Care instructions adapted under license by [employer] (which disclaims liability or warranty for this information). If you have questions about a medical condition or this instruction, always ask your healthcare professional. [place] [address] any warranty or liability for your use of this information. Learning About Physical Activity What is physical activity? Physical activity is any kind of activity that gets your body moving. The types of physical activity that can help you get fit and stay healthy include: · Aerobic or \"cardio\" activities that make your heart beat faster and make you breathe harder, such as brisk walking, riding a bike, or running. Aerobic activities strengthen your heart and lungs and build up your endurance. · Strength training activities that make your muscles work against, or \"resist,\" something, such as lifting weights or doing push-ups. These activities help tone and strengthen your muscles. · Stretches that allow you to move your joints and muscles through their full range of motion. Stretching helps you be more flexible and avoid injury. What are the benefits of physical activity? Being active is one of the best things you can do to get fit and stay healthy. It helps you to: · Feel stronger and have more energy to do all the things you like to do. · Focus better at school or work and perform better in sports. · Feel, think, and sleep better. · Reach and stay at a healthy weight. · Lose fat and build lean muscle. · Lower your risk for serious health problems. · Keep your bones, muscles, and joints strong. Being fit lets you do more physical activity. And it lets you work out harder without as much effort. How can you make physical activity part of your life? Get at least 30 minutes of exercise on most days of the week. Walking is a good choice. You also may want to do other activities, such as running, swimming, cycling, or playing tennis or team sports. Pick activities that you like-ones that make your heart beat faster, your muscles stronger, and your muscles and joints more flexible. If you find more than one thing you like doing, do them all. You don't have to do the same thing every day. Get your heart pumping every day. Any activity that makes your heart beat faster and keeps it at that rate for a while counts. Here are some great ways to get your heart beating faster: · Go for a brisk walk, run, or bike ride. · Go for a hike or swim. · Go in-line skating. · Play a game of touch football, basketball, or soccer. · Ride a bike. · Play tennis or racquetball. · Climb stairs. Even some household chores can be aerobic-just do them at a faster pace. Vacuuming, raking or mowing the lawn, sweeping the garage, and washing and waxing the car all can help get your heart rate up. Strengthen your muscles during the week. You don't have to lift heavy weights or grow big, bulky muscles to get stronger. Doing a few simple activities that make your muscles work against, or \"resist,\" something can help you get stronger. For example, you can: · Do push-ups or sit-ups, which use your own body weight as resistance. · Lift weights or dumbbells or use stretch bands at home or in a gym or community center. Stretch your muscles often. Stretching will help you as you become more active. It can help you stay flexible, loosen tight muscles, and avoid injury. It can also help improve your balance and posture and can be a great way to relax. Be sure to stretch the muscles you'll be using when you work out. It's best to warm your muscles slightly before you stretch them. Walk or do some other light aerobic activity for a few minutes, and then start stretching. When you stretch your muscles: · Do it slowly. Stretching is not about going fast or making sudden movements. · Don't push or bounce during a stretch. · Hold each stretch for at least 15 to 30 seconds, if you can. You should feel a stretch in the muscle, but not pain. · Breathe out as you do the stretch. Then breathe in as you hold the stretch. Don't hold your breath. If you're worried about how more activity might affect your health, have a checkup before you start. Follow any special advice your doctor gives you for getting a smart start. Where can you learn more? Go to http://alexander-celena.info/. Enter B562 in the search box to learn more about \"Learning About Physical Activity. \" Current as of: December 7, 2017 Content Version: 11.7 © 1341-0207 Healthwise, ideaTree - innovate | mentor | invest. Care instructions adapted under license by Concur Technologies (which disclaims liability or warranty for this information). If you have questions about a medical condition or this instruction, always ask your healthcare professional. [address] any warranty or liability for your use of this information. Learning About Sleeping Well What does sleeping well mean? Sleeping well means getting enough sleep. How much sleep is enough varies among people. The number of hours you sleep is not as important as how you feel when you wake up. If you do not feel refreshed, you probably need more sleep. Another sign of not getting enough sleep is feeling tired during the day. The average total nightly sleep time is 7½ to 8 hours. Healthy adults may need a little more or a little less than this. Why is getting enough sleep important? Getting enough quality sleep is a basic part of good health. When your sleep suffers, your mood and your thoughts can suffer too. You may find yourself feeling more grumpy or stressed. Not getting enough sleep also can lead to serious problems, including injury, accidents, anxiety, and depression. What might cause poor sleeping? Many things can cause sleep problems, including: · Stress. Stress can be caused by fear about a single event, such as giving a speech. Or you may have ongoing stress, such as worry about work or school. · Depression, anxiety, and other mental or emotional conditions. · Changes in your sleep habits or surroundings. This includes changes that happen where you sleep, such as noise, light, or sleeping in a different bed. It also includes changes in your sleep pattern, such as having jet lag or working a late shift. · Health problems, such as pain, breathing problems, and restless legs syndrome. · Lack of regular exercise. How can you help yourself? Here are some tips that may help you sleep more soundly and wake up feeling more refreshed. Your sleeping area · Use your bedroom only for sleeping and sex. A bit of light reading may help you fall asleep. But if it doesn't, do your reading elsewhere in the house. Don't watch TV in bed. · Be sure your bed is big enough to stretch out comfortably, especially if you have a sleep partner. · Keep your bedroom quiet, dark, and cool. Use curtains, blinds, or a sleep mask to block out light. To block out noise, use earplugs, soothing music, or a \"white noise\" machine. Your evening and bedtime routine · Create a relaxing bedtime routine. You might want to take a warm shower or bath, listen to soothing music, or drink a cup of noncaffeinated tea. · Go to bed at the same time every night. And get up at the same time every morning, even if you feel tired. What to avoid · Limit caffeine (coffee, tea, caffeinated sodas) during the day, and don't have any for at least 4 to 6 hours before bedtime. · Don't drink alcohol before bedtime. Alcohol can cause you to wake up more often during the night. · Don't smoke or use tobacco, especially in the evening. Nicotine can keep you awake. · Don't take naps during the day, especially close to bedtime. · Don't lie in bed awake for too long. If you can't fall asleep, or if you wake up in the middle of the night and can't get back to sleep within 15 minutes or so, get out of bed and go to another room until you feel sleepy. · Don't take medicine right before bed that may keep you awake or make you feel hyper or energized. Your doctor can tell you if your medicine may do this and if you can take it earlier in the day. If you can't sleep · Imagine yourself in a peaceful, pleasant scene. Focus on the details and feelings of being in a place that is relaxing. · Get up and do a quiet or boring activity until you feel sleepy. · Don't drink any liquids after 6 p.m. if you wake up often because you have to go to the bathroom. Where can you learn more? Go to http://alexander-celena.info/. Enter G851 in the search box to learn more about \"Learning About Sleeping Well. \" Current as of: December 7, 2017 Content Version: 11.7 © 1370-8139 [employer]. Care instructions adapted under license by [employer] (which disclaims liability or warranty for this information). If you have questions about a medical condition or this instruction, always ask your healthcare professional. [place] [address] any warranty or liability for your use of this information. Introducing [facility]! Dear Michelle Mendes: Thank you for requesting a [employer] account. Our records indicate that you already have an active [employer] account. You can access your account anytime at [email or web address] Did you know that you can access your hospital and ER discharge instructions at any time in [employer]? You can also review all of your test results from your hospital stay or ER visit. Additional Information If you have questions, please visit the Frequently Asked Questions section of the [employer] website at [email or web address] Remember, [employer] is NOT to be used for urgent needs. For medical emergencies, dial 911. Now available from your iPhone and Android! Please provide this summary of care documentation to your next provider. Your primary care clinician is listed as Serafin Alesia. If you have any questions after today's visit, please call [phone].

## 2018-08-24 NOTE — PROGRESS NOTES
HISTORY OF PRESENT ILLNESS  Laurent Aly is a 50 y.o. female. HPI: here for routine follow up. Wanted to discuss possible thyroid problem. Has thyroid nodule per ultrasound and needed to repeat ultrasound in one year. Seen endo and was told she does not have any active thyroid nodule. She david any change in voice or trouble swallowing. Hard to say heat or cold intolerance as having hot flashes. Has gained some weight. Feeling depressed on and off and now pending appt with psychiatrist/ counselor. Has h/o fibromyalgia, chronic fatigue. H/o ovarian cancer and total hysterectomy couple of years ago. following oncology. Started her on cymbalta and it has been helping her mood, hot flashes and fatigue. Feels there is a room for improvement and dose can be adjusted. No suicidal ideation. H/o vitamin D deficiency and on supplement. Also having sleep trouble. Taking melatonin as needed. Review recent labs. Lab Results   Component Value Date/Time    WBC 4.2 03/19/2018 12:00 AM    HGB 13.6 03/19/2018 12:00 AM    HCT 40.5 03/19/2018 12:00 AM    PLATELET 924 02/71/7473 12:00 AM    MCV 90 03/19/2018 12:00 AM     Lab Results   Component Value Date/Time    Sodium 141 03/19/2018 12:00 AM    Potassium 4.5 03/19/2018 12:00 AM    Chloride 100 03/19/2018 12:00 AM    CO2 22 03/19/2018 12:00 AM    Anion gap 3 09/27/2016 07:30 AM    Glucose 95 03/19/2018 12:00 AM    Glucose 93 10/24/2012 06:45 AM    BUN 12 03/19/2018 12:00 AM    Creatinine 0.89 03/19/2018 12:00 AM    BUN/Creatinine ratio 13 03/19/2018 12:00 AM    GFR est AA 89 03/19/2018 12:00 AM    GFR est non-AA 77 03/19/2018 12:00 AM    Calcium 9.4 03/19/2018 12:00 AM    Bilirubin, total 1.0 03/19/2018 12:00 AM    AST (SGOT) 20 03/19/2018 12:00 AM    Alk.  phosphatase 97 03/19/2018 12:00 AM    Protein, total 7.1 03/19/2018 12:00 AM    Albumin 4.3 03/19/2018 12:00 AM    Globulin 2.6 09/01/2016 12:28 PM    A-G Ratio 1.5 03/19/2018 12:00 AM    ALT (SGPT) 22 03/19/2018 12:00 AM     Lab Results   Component Value Date/Time    Cholesterol, total 222 (H) 10/09/2017 02:18 PM    HDL Cholesterol 75 10/09/2017 02:18 PM    LDL, calculated 122 (H) 10/09/2017 02:18 PM    VLDL, calculated 25 10/09/2017 02:18 PM    Triglyceride 124 10/09/2017 02:18 PM    CHOL/HDL Ratio 2.4 10/24/2012 06:45 AM     Lab Results   Component Value Date/Time    TSH 1.880 03/19/2018 12:00 AM     Lab Results   Component Value Date/Time    Hemoglobin A1c 5.3 03/19/2018 12:00 AM     Visit Vitals    /88 (BP 1 Location: Left arm, BP Patient Position: Sitting)    Pulse 71    Temp 97.9 °F (36.6 °C) (Oral)    Resp 16    Ht 5' 7\" (1.702 m)    Wt 219 lb 9.6 oz (99.6 kg)    SpO2 98%    BMI 34.39 kg/m2      C/o rt upper abdominal pain. Going on since 2 months on and off. Aggravates with certain position. No direct fall or injury. No relation to food. No nausea or vomiting. Not taking any medication for pain. It resolve on its own in few minutes. Sharp in nature and episodic. Denies drinking alcohol. No chest pain or sob. No headache or dizziness. No change in urine or bowel movement. ROS: see HPI     Physical Exam   Constitutional: She is oriented to person, place, and time. No distress. Neck: No thyromegaly present. Cardiovascular: Normal heart sounds. Pulmonary/Chest: No respiratory distress. She has no wheezes. Abdominal: Soft. Bowel sounds are normal. There is tenderness (rt upper quadrant ). There is no rebound and no guarding. Musculoskeletal: She exhibits no edema. Lymphadenopathy:     She has no cervical adenopathy. Neurological: She is oriented to person, place, and time. Psychiatric: Her behavior is normal. Thought content normal.       ASSESSMENT and PLAN    ICD-10-CM ICD-9-CM    1. Chronic fatigue: some what improved on cymblata. As still having some hot flashes, mood changes./ depression will consider increase cymbalta dose. F/u next visit.   R53.82 780.79 DULoxetine (CYMBALTA) 60 mg capsule   2. Fibromyalgia M79.7 729.1 DULoxetine (CYMBALTA) 60 mg capsule   3. Hot flashes R23.2 782.62 DULoxetine (CYMBALTA) 60 mg capsule   4. Pain of multiple sites R52 780.96 DULoxetine (CYMBALTA) 60 mg capsule   5. BMI 34.0-34.9,adult: discussed high BMI. Discussed diet modification, calorie count and exercise. Discussed importance of weight loss. agree to do exercise and life style modification. Diet and exercise hand out given in AVS. Also sent to nutritionist .    Z68.34 V85.34 REFERRAL TO NUTRITION   6. [facility] ([facility] see above  F39 296.90 DULoxetine (CYMBALTA) 60 mg capsule    has an appt for pyschiatrist    7. Ovarian carcinoma, unspecified laterality ([facility] C56.9 183.0     diagnosed 2 years ago. post complete hystrectomy. following oncology. 8. Right upper quadrant abdominal pain: checking ultrasound. R10.11 789.01 US GALLBLADDER   9. Vitamin D deficiency: on supplement. E55.9 268.9    10. Sleep trouble: continue melatonin as needed. Also given info about sleep hygiene. G47.9 780.50    Pt understood and agree with the plan   Review    Follow-up Disposition:  Return in about 1 month (around 9/24/2018).

## 2018-08-24 NOTE — PROGRESS NOTES
1. Have you been to the ER, urgent care clinic since your last visit? Hospitalized since your last visit? [facility] ED 7/16/18    2. Have you seen or consulted any other health care providers outside of the [address] [place] [address] since your last visit? Include any pap smears or colon screening. Dominick Santoro NP 4/24/2018 - she was not pleased with consult/wants to see another provider if referred to endo again. Dr. [facility] LOV: 1/2018. [facility] LOV: Stanislav Urbano 8/23/18    Last flu vaccine 2016  Patient has not had pneumonia vaccine. [facility]: Upcoming: Tuesday, 8/28/18 - [phone]. Referred by Stanislav Urbano.

## 2018-08-30 ENCOUNTER — HOSPITAL ENCOUNTER (OUTPATIENT)
Dept: ULTRASOUND IMAGING | Age: 48
Discharge: HOME | End: 2018-08-30
Attending: FAMILY MEDICINE
Payer: SELF-PAY

## 2018-08-30 DIAGNOSIS — R10.11 RIGHT UPPER QUADRANT ABDOMINAL PAIN: ICD-10-CM

## 2018-08-30 PROCEDURE — 76705 ECHO EXAM OF ABDOMEN: CPT

## 2018-08-30 NOTE — PROGRESS NOTES
Let pt know that no acute findings for now explains abdominal pain. Please keep a follow up appt to discuss this result further.

## 2018-08-30 NOTE — PROGRESS NOTES
Contacted patient and verified identity using name and date of birth (2- identifiers)  Spoke with patient and she verbalized understanding of no acute findings on her US. Follow-up as discussed.

## 2018-10-03 ENCOUNTER — OFFICE VISIT (OUTPATIENT)
Dept: FAMILY MEDICINE CLINIC | Age: 48
End: 2018-10-03

## 2018-10-03 ENCOUNTER — HOSPITAL ENCOUNTER (OUTPATIENT)
Dept: LAB | Age: 48
Discharge: HOME | End: 2018-10-03
Payer: SELF-PAY

## 2018-10-03 ENCOUNTER — DOCUMENTATION ONLY (OUTPATIENT)
Dept: FAMILY MEDICINE CLINIC | Age: 48
End: 2018-10-03

## 2018-10-03 VITALS
HEIGHT: 67 IN | WEIGHT: 210 LBS | OXYGEN SATURATION: 98 % | HEART RATE: 88 BPM | TEMPERATURE: 98 F | RESPIRATION RATE: 16 BRPM | SYSTOLIC BLOOD PRESSURE: 120 MMHG | DIASTOLIC BLOOD PRESSURE: 82 MMHG | BODY MASS INDEX: 32.96 KG/M2

## 2018-10-03 DIAGNOSIS — R53.82 CHRONIC FATIGUE: ICD-10-CM

## 2018-10-03 DIAGNOSIS — Z78.9 ALCOHOL USE: ICD-10-CM

## 2018-10-03 DIAGNOSIS — M79.7 FIBROMYALGIA: ICD-10-CM

## 2018-10-03 DIAGNOSIS — E55.9 VITAMIN D DEFICIENCY: ICD-10-CM

## 2018-10-03 DIAGNOSIS — E55.9 VITAMIN D DEFICIENCY: Primary | ICD-10-CM

## 2018-10-03 DIAGNOSIS — R11.15 NON-INTRACTABLE CYCLICAL VOMITING WITH NAUSEA: ICD-10-CM

## 2018-10-03 DIAGNOSIS — K21.9 GASTROESOPHAGEAL REFLUX DISEASE WITHOUT ESOPHAGITIS: Primary | ICD-10-CM

## 2018-10-03 DIAGNOSIS — F39 MOOD DISORDER (HCC): ICD-10-CM

## 2018-10-03 LAB
25(OH)D3 SERPL-MCNC: 20 NG/ML (ref 30–100)
ERYTHROCYTE [DISTWIDTH] IN BLOOD BY AUTOMATED COUNT: 12.8 % (ref 11.6–14.5)
HCT VFR BLD AUTO: 40.7 % (ref 35–45)
HGB BLD-MCNC: 13.9 G/DL (ref 12–16)
MCH RBC QN AUTO: 31.2 PG (ref 24–34)
MCHC RBC AUTO-ENTMCNC: 34.2 G/DL (ref 31–37)
MCV RBC AUTO: 91.3 FL (ref 74–97)
PLATELET # BLD AUTO: 267 K/UL (ref 135–420)
PMV BLD AUTO: 9.9 FL (ref 9.2–11.8)
RBC # BLD AUTO: 4.46 M/UL (ref 4.2–5.3)
WBC # BLD AUTO: 5.6 K/UL (ref 4.6–13.2)

## 2018-10-03 PROCEDURE — 85027 COMPLETE CBC AUTOMATED: CPT | Performed by: FAMILY MEDICINE

## 2018-10-03 PROCEDURE — 82306 VITAMIN D 25 HYDROXY: CPT | Performed by: FAMILY MEDICINE

## 2018-10-03 PROCEDURE — 36415 COLL VENOUS BLD VENIPUNCTURE: CPT | Performed by: FAMILY MEDICINE

## 2018-10-03 RX ORDER — PANTOPRAZOLE SODIUM 20 MG/1
20 TABLET, DELAYED RELEASE ORAL DAILY
Qty: 30 TAB | Refills: 1 | Status: SHIPPED | OUTPATIENT
Start: 2018-10-03

## 2018-10-03 RX ORDER — LAMOTRIGINE 25 MG/1
TABLET ORAL
Refills: 0 | COMMUNITY
Start: 2018-08-28

## 2018-10-03 RX ORDER — ERGOCALCIFEROL 1.25 MG/1
50000 CAPSULE ORAL
Qty: 12 CAP | Refills: 0 | Status: SHIPPED | OUTPATIENT
Start: 2018-10-03

## 2018-10-03 NOTE — PROGRESS NOTES
Let pt know that vitamin D level is low. Giving drisdol to take. Thanks. Also find out when she is having Gi appt. Thanks.

## 2018-10-03 NOTE — PATIENT INSTRUCTIONS
Upper Gastrointestinal Bleeding: Care Instructions  Your Care Instructions    The digestive or gastrointestinal tract goes from the mouth to the anus. It is often called the GI tract. Bleeding in the upper GI tract can happen anywhere from the esophagus to the first part of the small intestine. Sometimes it's caused by an ulcer in your stomach. Or it may be caused by blood vessels in your esophagus. Your esophagus is the tube that carries food from your throat to your stomach. Light bleeding may not cause any symptoms at first. But if you continue to bleed for a while, you may feel very weak or tired. Sudden, heavy bleeding means you need to see a doctor right away. This kind of bleeding can be very dangerous. But it can usually be cured or controlled. The doctor may do some tests to find the cause of your bleeding. Follow-up care is a key part of your treatment and safety. Be sure to make and go to all appointments, and call your doctor if you are having problems. It's also a good idea to know your test results and keep a list of the medicines you take. How can you care for yourself at home? · Be safe with medicines. Take your medicines exactly as prescribed. Call your doctor if you think you are having a problem with your medicine. You will get more details on the specific medicines your doctor prescribes. · Do not take aspirin or other anti-inflammatory medicines, such as naproxen (Aleve) or ibuprofen (Advil, Motrin), without talking to your doctor first. Ask your doctor if it is okay to use acetaminophen (Tylenol). · Do not drink alcohol. · The bleeding may make you lose iron. So it's important to eat foods that have a lot of iron. These include red meat, shellfish, poultry, and eggs. They also include beans, raisins, whole-grain breads, and leafy green vegetables. If you want help planning meals, you can meet with a dietitian. When should you call for help?   Call 911 anytime you think you may need emergency care. For example, call if:    · You have sudden, severe belly pain.     · You vomit blood or what looks like coffee grounds.     · You passed out (lost consciousness).     · Your stools are maroon or very bloody.    Call your doctor now or seek immediate medical care if:    · You are dizzy or lightheaded, or you feel like you may faint.     · Your stools are black and look like tar.     · You have belly pain.     · You vomit or have nausea.     · You have trouble swallowing, or it hurts when you swallow.    Watch closely for changes in your health, and be sure to contact your doctor if you do not get better as expected. Where can you learn more? Go to http://alexander-celena.info/. Enter I357 in the search box to learn more about \"Upper Gastrointestinal Bleeding: Care Instructions. \"  Current as of: November 20, 2017  Content Version: 11.7  © 2508-0331 Z[employer]ory. Care instructions adapted under license by [employer] (which disclaims liability or warranty for this information). If you have questions about a medical condition or this instruction, always ask your healthcare professional. [place] [address] any warranty or liability for your use of this information. Fibromyalgia: Care Instructions  Your Care Instructions    Fibromyalgia is a painful condition that is not completely understood by medical experts. The cause of fibromyalgia is not known. It can make you feel tired and ache all over. It causes tender spots at specific points of the body that hurt only when you press on them. You may have trouble sleeping, as well as other symptoms. These problems can upset your work and home life. Symptoms tend to come and go, although they may never go away completely. Fibromyalgia does not harm your muscles, joints, or organs. Follow-up care is a key part of your treatment and safety.  Be sure to make and go to all appointments, and call your doctor if you are having problems. It's also a good idea to know your test results and keep a list of the medicines you take. How can you care for yourself at home? · Exercise often. Walk, swim, or bike to help with pain and sleep problems and to make you feel better. · Try to get a good night's sleep. Go to bed and get up at the same time each day, whether you feel rested or not. Make sure you have a good mattress and pillow. · Reduce stress. Avoid things that cause you stress, if you can. If not, work at making them less stressful. Learn to use biofeedback, guided imagery, meditation, or other methods to relax. · Make healthy changes. Eat a balanced diet, quit smoking, and limit alcohol and caffeine. · Use a heating pad set on low or take warm baths or showers for pain. Using cold packs for up to 20 minutes at a time can also relieve pain. Put a thin cloth between the cold pack and your skin. A gentle massage might help too. · Be safe with medicines. Take your medicines exactly as prescribed. Call your doctor if you think you are having a problem with your medicine. Your doctor may talk to you about taking antidepressant medicines. These medicines may improve sleep, relieve pain, and in some cases treat depression. · Learn about fibromyalgia. This makes coping easier. Then, take an active role in your treatment. · Think about joining a support group with others who have fibromyalgia to learn more and get support. When should you call for help? Watch closely for changes in your health, and be sure to contact your doctor if:    · You feel sad, helpless, or hopeless; lose interest in things you used to enjoy; or have other symptoms of depression.     · Your fibromyalgia symptoms get worse. Where can you learn more? Go to http://alexander-celena.info/. Enter V003 in the search box to learn more about \"Fibromyalgia: Care Instructions. \"  Current as of: October 9, 2017  Content Version: 11.7  © 3758-7208 [employer]Vue. Care instructions adapted under license by [employer] (which disclaims liability or warranty for this information). If you have questions about a medical condition or this instruction, always ask your healthcare professional. [address] any warranty or liability for your use of this information. Gastroesophageal Reflux Disease (GERD): Care Instructions  Your Care Instructions    Gastroesophageal reflux disease (GERD) is the backward flow of stomach acid into the esophagus. The esophagus is the tube that leads from your throat to your stomach. A one-way valve prevents the stomach acid from moving up into this tube. When you have GERD, this valve does not close tightly enough. If you have mild GERD symptoms including heartburn, you may be able to control the problem with antacids or over-the-counter medicine. Changing your diet, losing weight, and making other lifestyle changes can also help reduce symptoms. Follow-up care is a key part of your treatment and safety. Be sure to make and go to all appointments, and call your doctor if you are having problems. It's also a good idea to know your test results and keep a list of the medicines you take. How can you care for yourself at home? · Take your medicines exactly as prescribed. Call your doctor if you think you are having a problem with your medicine. · Your doctor may recommend over-the-counter medicine. For mild or occasional indigestion, antacids, such as Tums, Gaviscon, Mylanta, or Maalox, may help. Your doctor also may recommend over-the-counter acid reducers, such as Pepcid AC, Tagamet HB, Zantac 75, or Prilosec. Read and follow all instructions on the label. If you use these medicines often, talk with your doctor. · Change your eating habits. ¨ It's best to eat several small meals instead of two or three large meals.   ¨ After you eat, wait 2 to 3 hours before you lie down. ¨ Chocolate, mint, and alcohol can make GERD worse. ¨ Spicy foods, foods that have a lot of acid (like tomatoes and oranges), and coffee can make GERD symptoms worse in some people. If your symptoms are worse after you eat a certain food, you may want to stop eating that food to see if your symptoms get better. · Do not smoke or chew tobacco. Smoking can make GERD worse. If you need help quitting, talk to your doctor about stop-smoking programs and medicines. These can increase your chances of quitting for good. · If you have GERD symptoms at night, raise the head of your bed 6 to 8 inches by putting the frame on blocks or placing a foam wedge under the head of your mattress. (Adding extra pillows does not work.)  · Do not wear tight clothing around your middle. · Lose weight if you need to. Losing just 5 to 10 pounds can help. When should you call for help? Call your doctor now or seek immediate medical care if:    · You have new or different belly pain.     · Your stools are black and tarlike or have streaks of blood.    Watch closely for changes in your health, and be sure to contact your doctor if:    · Your symptoms have not improved after 2 days.     · Food seems to catch in your throat or chest.   Where can you learn more? Go to http://alexander-celena.info/. Enter N964 in the search box to learn more about \"Gastroesophageal Reflux Disease (GERD): Care Instructions. \"  Current as of: May 12, 2017  Content Version: 11.7  © 3749-4442 [employer]. Care instructions adapted under license by [employer] (which disclaims liability or warranty for this information). If you have questions about a medical condition or this instruction, always ask your healthcare professional. [address] any warranty or liability for your use of this information.

## 2018-10-03 NOTE — MR AVS SNAPSHOT
[facility] [address] [place] [address] 
[phone] Patient: Terrell Fine MRN: [id number] Visit Information Date & Time Provider Department Dept. Phone Encounter #  
 10/3/2018 11:30 AM Kirk Warren, [address] [place] [address] Road [id number] Follow-up Instructions Return in about 2 weeks (around 10/17/2018). Upcoming Health Maintenance Date Due Influenza Age 5 to Adult 8/1/2018 Pneumococcal 19-64 Highest Risk (2 of 3 - PCV13) 8/22/2018 DTaP/Tdap/Td series (2 - Td) 8/22/2027 Allergies as of 10/3/2018  Review Complete On: 10/3/2018 By: Morena Garcia LPN No Known Allergies Current Immunizations  Reviewed on 3/11/2016 Name Date Influenza Vaccine (Quad) PF 10/13/2016  2:49 PM  
  
 Not reviewed this visit You Were Diagnosed With   
  
 Codes Comments Gastroesophageal reflux disease without esophagitis    -  Primary ICD-10-CM: K21.9 ICD-9-CM: 530.81 Non-intractable cyclical vomiting with nausea     ICD-10-CM: G43. A0 ICD-9-CM: 536.2 Mood disorder ([facility]     ICD-10-CM: F39 
ICD-9-CM: 296.90 Chronic fatigue     ICD-10-CM: R53.82 
ICD-9-CM: 780.79 Alcohol use     ICD-10-CM: Z78.9 ICD-9-CM: V49.89 Fibromyalgia     ICD-10-CM: M79.7 ICD-9-CM: 729.1 Vitamin D deficiency     ICD-10-CM: E55.9 ICD-9-CM: 268.9 Vitals BP Pulse Temp Resp Height(growth percentile) Weight(growth percentile) 120/82 (BP 1 Location: Left arm, BP Patient Position: Sitting) 88 98 °F (36.7 °C) (Oral) 16 5' 7\" (1.702 m) 210 lb (95.3 kg) LMP SpO2 BMI OB Status Smoking Status 08/02/2016 98% 32.89 kg/m2 Hysterectomy Former Smoker BMI and BSA Data Body Mass Index Body Surface Area  
 32.89 kg/m 2 2.12 m 2 Preferred Pharmacy Pharmacy Name Phone [id number] [address] [place] [phone] Your Updated Medication List  
  
   
This list is accurate as of 10/3/18 12:03 PM.  Always use your most recent med list.  
  
  
  
  
 DULoxetine 60 mg capsule Commonly known as:  CYMBALTA Take 1 Cap by mouth daily. ibuprofen 200 mg tablet Commonly known as:  MOTRIN Take 600 mg by mouth every six (6) hours as needed for Pain.  
  
 lamoTRIgine 25 mg tablet Commonly known as: LaMICtal  
TAKE 1 TAB BY MOUTH ONCE PER DAY FOR 14 DAYS THEN TAKE 2 TABS BY MOUTH EVERY DAY  
  
 OTHER Take 500 mg by mouth daily. Tumeric  
  
 pantoprazole 20 mg tablet Commonly known as:  PROTONIX Take 1 Tab by mouth daily. REXULTI PO Take  by mouth. traMADol 50 mg tablet Commonly known as:  ULTRAM  
50 mg.  
  
  
  
  
Prescriptions Sent to Pharmacy Refills  
 pantoprazole (PROTONIX) 20 mg tablet 1 Sig: Take 1 Tab by mouth daily. Class: Normal  
 Pharmacy: [facility] [address] [place] [address] #: [phone] Route: Oral  
  
We Performed the Following REFERRAL TO [facility]STR[facility]ENTEROLOGY [facility] Comments:  
 Or first available Follow-up Instructions Return in about 2 weeks (around 10/17/2018). To-Do List   
 10/03/2018 Lab:  CBC W/O DIFF   
  
 10/03/2018 Lab:  VITAMIN D, 25 HYDROXY Referral Information Referral ID Referred By Referred To  
  
 [id number] [facility], [address] [place] [address] [address] [place], [address] [place] [address] Str. Phone: [phone] Fax: [phone] Visits Status Start Date End Date 1 New Request 10/3/18 10/3/19 If your referral has a status of pending review or denied, additional information will be sent to support the outcome of this decision. Patient Instructions Upper Gastrointestinal Bleeding: Care Instructions Your Care Instructions The digestive or gastrointestinal tract goes from the mouth to the anus. It is often called the GI tract. Bleeding in the upper GI tract can happen anywhere from the esophagus to the first part of the small intestine. Sometimes it's caused by an ulcer in your stomach. Or it may be caused by blood vessels in your esophagus. Your esophagus is the tube that carries food from your throat to your stomach. Light bleeding may not cause any symptoms at first. But if you continue to bleed for a while, you may feel very weak or tired. Sudden, heavy bleeding means you need to see a doctor right away. This kind of bleeding can be very dangerous. But it can usually be cured or controlled. The doctor may do some tests to find the cause of your bleeding. Follow-up care is a key part of your treatment and safety. Be sure to make and go to all appointments, and call your doctor if you are having problems. It's also a good idea to know your test results and keep a list of the medicines you take. How can you care for yourself at home? · Be safe with medicines. Take your medicines exactly as prescribed. Call your doctor if you think you are having a problem with your medicine. You will get more details on the specific medicines your doctor prescribes. · Do not take aspirin or other anti-inflammatory medicines, such as naproxen (Aleve) or ibuprofen (Advil, Motrin), without talking to your doctor first. Ask your doctor if it is okay to use acetaminophen (Tylenol). · Do not drink alcohol. · The bleeding may make you lose iron. So it's important to eat foods that have a lot of iron. These include red meat, shellfish, poultry, and eggs. They also include beans, raisins, whole-grain breads, and leafy green vegetables. If you want help planning meals, you can meet with a dietitian. When should you call for help? Call 911 anytime you think you may need emergency care. For example, call if: 
  · You have sudden, severe belly pain.  
  · You vomit blood or what looks like coffee grounds.   · You passed out (lost consciousness).  
  · Your stools are maroon or very bloody.  
 Call your doctor now or seek immediate medical care if: 
  · You are dizzy or lightheaded, or you feel like you may faint.  
  · Your stools are black and look like tar.  
  · You have belly pain.  
  · You vomit or have nausea.  
  · You have trouble swallowing, or it hurts when you swallow.  
 Watch closely for changes in your health, and be sure to contact your doctor if you do not get better as expected. Where can you learn more? Go to http://alexander-celena.info/. Enter Y622 in the search box to learn more about \"Upper Gastrointestinal Bleeding: Care Instructions. \" Current as of: November 20, 2017 Content Version: 11.7 © 1815-0327 [employer]. Care instructions adapted under license by [employer] (which disclaims liability or warranty for this information). If you have questions about a medical condition or this instruction, always ask your healthcare professional. [place] [address] any warranty or liability for your use of this information. Fibromyalgia: Care Instructions Your Care Instructions Fibromyalgia is a painful condition that is not completely understood by medical experts. The cause of fibromyalgia is not known. It can make you feel tired and ache all over. It causes tender spots at specific points of the body that hurt only when you press on them. You may have trouble sleeping, as well as other symptoms. These problems can upset your work and home life. Symptoms tend to come and go, although they may never go away completely. Fibromyalgia does not harm your muscles, joints, or organs. Follow-up care is a key part of your treatment and safety. Be sure to make and go to all appointments, and call your doctor if you are having problems. It's also a good idea to know your test results and keep a list of the medicines you take. How can you care for yourself at home? · Exercise often. Walk, swim, or bike to help with pain and sleep problems and to make you feel better. · Try to get a good night's sleep. Go to bed and get up at the same time each day, whether you feel rested or not. Make sure you have a good mattress and pillow. · Reduce stress. Avoid things that cause you stress, if you can. If not, work at making them less stressful. Learn to use biofeedback, guided imagery, meditation, or other methods to relax. · Make healthy changes. Eat a balanced diet, quit smoking, and limit alcohol and caffeine. · Use a heating pad set on low or take warm baths or showers for pain. Using cold packs for up to 20 minutes at a time can also relieve pain. Put a thin cloth between the cold pack and your skin. A gentle massage might help too. · Be safe with medicines. Take your medicines exactly as prescribed. Call your doctor if you think you are having a problem with your medicine. Your doctor may talk to you about taking antidepressant medicines. These medicines may improve sleep, relieve pain, and in some cases treat depression. · Learn about fibromyalgia. This makes coping easier. Then, take an active role in your treatment. · Think about joining a support group with others who have fibromyalgia to learn more and get support. When should you call for help? Watch closely for changes in your health, and be sure to contact your doctor if: 
  · You feel sad, helpless, or hopeless; lose interest in things you used to enjoy; or have other symptoms of depression.  
  · Your fibromyalgia symptoms get worse. Where can you learn more? Go to http://alexander-celena.info/. Enter V003 in the search box to learn more about \"Fibromyalgia: Care Instructions. \" Current as of: October 9, 2017 Content Version: 11.7 © 0102-5697 [employer].  Care instructions adapted under license by [address] (which disclaims liability or warranty for this information). If you have questions about a medical condition or this instruction, always ask your healthcare professional. [address] any warranty or liability for your use of this information. Gastroesophageal Reflux Disease (GERD): Care Instructions Your Care Instructions Gastroesophageal reflux disease (GERD) is the backward flow of stomach acid into the esophagus. The esophagus is the tube that leads from your throat to your stomach. A one-way valve prevents the stomach acid from moving up into this tube. When you have GERD, this valve does not close tightly enough. If you have mild GERD symptoms including heartburn, you may be able to control the problem with antacids or over-the-counter medicine. Changing your diet, losing weight, and making other lifestyle changes can also help reduce symptoms. Follow-up care is a key part of your treatment and safety. Be sure to make and go to all appointments, and call your doctor if you are having problems. It's also a good idea to know your test results and keep a list of the medicines you take. How can you care for yourself at home? · Take your medicines exactly as prescribed. Call your doctor if you think you are having a problem with your medicine. · Your doctor may recommend over-the-counter medicine. For mild or occasional indigestion, antacids, such as Tums, Gaviscon, Mylanta, or Maalox, may help. Your doctor also may recommend over-the-counter acid reducers, such as Pepcid AC, Tagamet HB, Zantac 75, or Prilosec. Read and follow all instructions on the label. If you use these medicines often, talk with your doctor. · Change your eating habits. ¨ It's best to eat several small meals instead of two or three large meals. ¨ After you eat, wait 2 to 3 hours before you lie down. ¨ Chocolate, mint, and alcohol can make GERD worse. ¨ Spicy foods, foods that have a lot of acid (like tomatoes and oranges), and coffee can make GERD symptoms worse in some people. If your symptoms are worse after you eat a certain food, you may want to stop eating that food to see if your symptoms get better. · Do not smoke or chew tobacco. Smoking can make GERD worse. If you need help quitting, talk to your doctor about stop-smoking programs and medicines. These can increase your chances of quitting for good. · If you have GERD symptoms at night, raise the head of your bed 6 to 8 inches by putting the frame on blocks or placing a foam wedge under the head of your mattress. (Adding extra pillows does not work.) · Do not wear tight clothing around your middle. · Lose weight if you need to. Losing just 5 to 10 pounds can help. When should you call for help? Call your doctor now or seek immediate medical care if: 
  · You have new or different belly pain.  
  · Your stools are black and tarlike or have streaks of blood.  
 Watch closely for changes in your health, and be sure to contact your doctor if: 
  · Your symptoms have not improved after 2 days.  
  · Food seems to catch in your throat or chest.  
Where can you learn more? Go to http://alexander-celena.info/. Enter F492 in the search box to learn more about \"Gastroesophageal Reflux Disease (GERD): Care Instructions. \" Current as of: May 12, 2017 Content Version: 11.7 © 8625-9312 Healthwise, Incorporated. Care instructions adapted under license by [employer] (which disclaims liability or warranty for this information). If you have questions about a medical condition or this instruction, always ask your healthcare professional. [place] [address] any warranty or liability for your use of this information. Introducing [facility]! Dear Violeta Perez: Thank you for requesting a AMEE account.   Our records indicate that you already have an active Sonopia account. You can access your account anytime at [email or web address] Did you know that you can access your hospital and ER discharge instructions at any time in Sonopia? You can also review all of your test results from your hospital stay or ER visit. Additional Information If you have questions, please visit the Frequently Asked Questions section of the Sonopia website at [email or web address] Remember, Sonopia is NOT to be used for urgent needs. For medical emergencies, dial 911. Now available from your iPhone and Android! Please provide this summary of care documentation to your next provider. Your primary care clinician is listed as Yan De Leon. If you have any questions after today's visit, please call [phone].

## 2018-10-03 NOTE — PROGRESS NOTES
Patient called after hours c/o blood in vomitus. No signs or symptoms of acute blood loss reported. No change in abd symptoms that have been chronic. Will make appt to see PCP in office.

## 2018-10-04 NOTE — PROGRESS NOTES
Contacted patient and verified identity using name and date of birth (2- identifiers)  Spoke with patient and she verbalized understanding of low Vit D needing weekly Drisdol. Does not have a GI appt yet, she states she will call today.

## 2018-10-19 DIAGNOSIS — R23.2 HOT FLASHES: ICD-10-CM

## 2018-10-19 DIAGNOSIS — R52 PAIN OF MULTIPLE SITES: ICD-10-CM

## 2018-10-19 DIAGNOSIS — M79.7 FIBROMYALGIA: ICD-10-CM

## 2018-10-19 DIAGNOSIS — R45.86 MOOD CHANGES: ICD-10-CM

## 2018-10-19 DIAGNOSIS — R53.82 CHRONIC FATIGUE: ICD-10-CM

## 2018-10-19 RX ORDER — DULOXETIN HYDROCHLORIDE 60 MG/1
60 CAPSULE, DELAYED RELEASE ORAL DAILY
Qty: 30 CAP | Refills: 1 | Status: SHIPPED | OUTPATIENT
Start: 2018-10-19 | End: 2018-12-21 | Stop reason: SDUPTHER

## 2018-10-19 NOTE — TELEPHONE ENCOUNTER
This pharmacy faxed over request for the following prescriptions to be filled:    Medication requested :   Requested Prescriptions     Pending Prescriptions Disp Refills    DULoxetine (CYMBALTA) 60 mg capsule 30 Cap 1     Sig: Take 1 Cap by mouth daily. PCP: Angelita Herrera or Print[facility] or Local [facility] [address] [place] [address]     Scheduled appointment if not seen by current providers in office:  LOV 10/3/2018 No f/u up Scheduled at this time.   Due 10/17/2018 LMOV to schedule f/u

## 2018-10-29 ENCOUNTER — TELEPHONE (OUTPATIENT)
Dept: FAMILY MEDICINE CLINIC | Age: 48
End: 2018-10-29

## 2018-11-02 NOTE — TELEPHONE ENCOUNTER
Pt states that its her herniated disc lumbar pressing on the sciatic nerve and is causing pain. She states that she is unable to sleep. Since she doesn't have an appt with the spine Dr till 11/28/2018 she needs something for the pain. Please advise.

## 2018-11-05 RX ORDER — CYCLOBENZAPRINE HCL 5 MG
5 TABLET ORAL
Qty: 10 TAB | Refills: 0 | Status: SHIPPED | OUTPATIENT
Start: 2018-11-05

## 2018-11-07 NOTE — TELEPHONE ENCOUNTER
Contacted patient and verified identity using name and date of birth (2- identifiers)  Spoke with patient and she apologized for missing the appointment as she did not hear her alarm and she was embarrassed to call. She did state that she is still in pain and it's mostly the burning down her legs and in her back. I inquired about muscle relaxer and she declined stating she didn't think it would help. She states for right now she will hold off until Ortho appointment.

## 2018-11-27 ENCOUNTER — OFFICE VISIT (OUTPATIENT)
Dept: ORTHOPEDIC SURGERY | Age: 48
End: 2018-11-27

## 2018-11-27 VITALS
HEART RATE: 105 BPM | WEIGHT: 219.8 LBS | DIASTOLIC BLOOD PRESSURE: 91 MMHG | OXYGEN SATURATION: 98 % | RESPIRATION RATE: 16 BRPM | TEMPERATURE: 98.1 F | BODY MASS INDEX: 34.5 KG/M2 | SYSTOLIC BLOOD PRESSURE: 124 MMHG | HEIGHT: 67 IN

## 2018-11-27 DIAGNOSIS — M54.16 LUMBAR NEURITIS: ICD-10-CM

## 2018-11-27 DIAGNOSIS — M51.36 DDD (DEGENERATIVE DISC DISEASE), LUMBAR: ICD-10-CM

## 2018-11-27 DIAGNOSIS — M51.26 HNP (HERNIATED NUCLEUS PULPOSUS), LUMBAR: Primary | ICD-10-CM

## 2018-11-27 RX ORDER — CHOLECALCIFEROL (VITAMIN D3) 125 MCG
CAPSULE ORAL
COMMUNITY

## 2018-11-27 NOTE — PROGRESS NOTES
[facility]  [address], [place]  Phone: [phone]  Fax: [phone]        INITIAL CONSULTATION      HISTORY OF PRESENT ILLNESS:  Odalys Jones is a 50 y.o. female whom is referred from Radha Pastor MD secondary to progressive low back pain extending into the LLE in an S1 distribution since February. She rates her pain 1/10. She is on Cymbalta 60 mg BID and Lamictal. She slipped on ice and fell on her left hip, with onset of pain. She fell again down the stairs in September, with exacerbation of her pain. She reports some recent improvement. Her pain is exacerbated by prolonged sitting and alleviated with activity. She denies a hx of lumbar surgery, injections, or recent PT. She treats with Aleve with relief. She has treated with Ibuprofen and Tylenol with minimal relief. PmHx bipolar disorder (She is followed by Jean Shelton, [facility] for this, [phone]), EtOH abuse, drug overdose, suicide attempt, and ovarian cancer (Her oncologist is Dr. Taylor Hennessy, hx of total hysterectomy, and chemotherapy/surgery). Pt denies change in bowel or bladder habits. She reports a questionable weight loss of 10-15 lbs over 2 months. Pt denies fever or skin changes. Note from Odalis Bull, [address] dated 7/16/18 indicating patient was seen with c/o low back pain into the lower extremity x 3 months. Radicular pain into the LLE with paraesthesias of the foot. She has had XR of the entire spine in 2017. She has seen a rheumatologist and was dx with Fibromyalgia. She did not tolerate Neurontin. She was put on Cymbalta with improvement. Note from Dr. Smiley Gould dated 10/3/18 indicating patient was seen with c/o hx of fibromyalgia with poorly controlled pain. Abd/pelv CT dated 7/16/18 reviewed. Per report, interval progression of degenerative disc disease at L5-S1.  Although this is a body CT, there is evidence of a eccentric increasing broad-based protrusion with a left paracentral and superior disc extrusion. This does appear to potentially affect the left S1 root. No high-grade central stenosis. This is felt to be an increase in degenerative disc disease from previous. Clinical correlation is recommended as to whether or not the patient's symptoms include a left S1 radiculopathy. Some endplate irregularity inferior aspect of L5 more prominent than previous, likely Schmorl's nodes. No adjacent inflammatory change is seen that would suggest discitis/osteomyelitis. Evidence of hysterectomy. Small hypodensities within the liver are likely simple cysts but some are too small to characterize, stable. No acute abnormality that would with confidence correlate with acute pain. The patient is RHD.  reviewed. Body mass index is 34.43 kg/m². PCP: Claudean Favor, MD    Past Medical History:   Diagnosis Date    Bipolar affective ([facility]     Cancer ([facility]     OVARIAN    Depression     Psychiatric disorder     anxiety, depresion, bipolar        Past Surgical History:   Procedure Laterality Date    HX GYN  2015    Uterine Artery Embolization-[place]livas    HX HEENT      Tonsilectonmy     HX HYSTERECTOMY         Social History     Tobacco Use    Smoking status: Former Smoker     Last attempt to quit: 2009     Years since quittin.9    Smokeless tobacco: Never Used   Substance Use Topics    Alcohol use: No     Alcohol/week: 0.0 oz     Work status: The patient is not employed. Marital status: The patient is . Current Outpatient Medications   Medication Sig Dispense Refill    cyclobenzaprine (FLEXERIL) 5 mg tablet Take 1 Tab by mouth nightly as needed for Muscle Spasm(s). Avoid driving after taking medication as can make sleepy or dizziness 10 Tab 0    DULoxetine (CYMBALTA) 60 mg capsule Take 1 Cap by mouth daily.  30 Cap 1    lamoTRIgine (LAMICTAL) 25 mg tablet TAKE 1 TAB BY MOUTH ONCE PER DAY FOR 14 DAYS THEN TAKE 2 TABS BY MOUTH EVERY DAY  0    brexpiprazole (REXULTI PO) Take  by mouth.      pantoprazole (PROTONIX) 20 mg tablet Take 1 Tab by mouth daily. 30 Tab 1    ergocalciferol (DRISDOL) 50,000 unit capsule Take 1 Cap by mouth every seven (7) days. 12 Cap 0    traMADol (ULTRAM) 50 mg tablet 50 mg.      ibuprofen (MOTRIN) 200 mg tablet Take 600 mg by mouth every six (6) hours as needed for Pain.  OTHER Take 500 mg by mouth daily. Tumeric          No Known Allergies       Family History   Problem Relation Age of Onset    Depression Mother     Alcohol abuse Mother     Other Mother         Tobacco use    [place] Arthritis-rheumatoid Mother     Ovarian Cancer Mother     Other Father         Tobacco use    Other Sister         Tobacco use    Alcohol abuse Paternal Grandfather     Cancer Paternal Grandmother         Lung Cancer    Arthritis-rheumatoid Maternal Grandmother     Arthritis-rheumatoid Maternal Grandfather          REVIEW OF SYSTEMS  Constitutional symptoms: Negative  Eyes: Negative  Ears, Nose, Throat, and Mouth: Negative  Cardiovascular: Negative  Respiratory: Negative  Genitourinary: Negative  Integumentary (Skin and/or breast): Negative  Musculoskeletal: Positive for low back pain, LLE pain  Extremities: Negative for edema. Endocrine/Rheumatologic: Negative  Hematologic/Lymphatic: Negative  Allergic/Immunologic: Negative  Psychiatric: Negative       PHYSICAL EXAMINATION  Visit Vitals  BP (!) 124/91   Pulse (!) 105   Temp 98.1 °F (36.7 °C) (Oral)   Resp 16   Ht 5' 7\" (1.702 m)   Wt 219 lb 12.8 oz (99.7 kg)   LMP 08/02/2016   SpO2 98%   BMI 34.43 kg/m²       CONSTITUTIONAL: NAD, A&O x 3  HEART: Regular rate and rhythm  ABDOMEN: Positive bowel sounds, soft, nontender, and nondistended  LUNGS: Clear to auscultation bilaterally. SKIN: Negative for rash. RANGE OF MOTION: The patient has full passive range of motion in all four extremities. SENSATION: Sensation is intact to light touch throughout.   MOTOR:   Straight Leg Raise: Negative, bilateral  Hinson: Negative, bilateral  Deep tendon reflexes are 2+ at the brachioradialis, biceps, and 1+ at the triceps bilaterally. Deep tendon reflexes are 2+ at the knees and ankles bilaterally. Shoulder AB/Flex Elbow Flex Wrist Ext Elbow Ext Wrist Flex Hand Intrin Tone   Right +4/5 +4/5 +4/5 +4/5 +4/5 +4/5 +4/5   Left +4/5 +4/5 +4/5 +4/5 +4/5 +4/5 +4/5              Hip Flex Knee Ext Knee Flex Ankle DF GTE Ankle PF Tone   Right +4/5 +4/5 +4/5 +4/5 +4/5 +4/5 +4/5   Left +4/5 +4/5 +4/5 +4/5 +4/5 +4/5 +4/5         ASSESSMENT   Diagnoses and all orders for this visit:    1. HNP (herniated nucleus pulposus), lumbar    2. DDD (degenerative disc disease), lumbar    3. Lumbar neuritis           IMPRESSIONS/RECOMMENDATIONS:  Patient presents with progressive low back pain extending into the LLE in an S1 distribution after a fall. The patient does not think her remaining pain complaints are severe enough to warrant additional workup/treatment at this time. Physical therapy declined. Patient is neurologically intact. Multiple treatment options were discussed. Patient is not interested in surgical intervention or lumbar blocks at this time. I will see the patient back prn. Written by Jc Vitale, as dictated by Merline Chan MD  I examined the patient, reviewed and agree with the note.

## 2019-07-07 ENCOUNTER — HOSPITAL ENCOUNTER (OUTPATIENT)
Dept: CT IMAGING | Age: 49
Discharge: HOME | End: 2019-07-07
Attending: OBSTETRICS & GYNECOLOGY
Payer: MEDICAID

## 2019-07-07 DIAGNOSIS — C56.1 MALIGNANT NEOPLASM OF RIGHT OVARY (HCC): ICD-10-CM

## 2019-07-07 PROCEDURE — 74011636320 HC RX REV CODE- 636/320: Performed by: OBSTETRICS & GYNECOLOGY

## 2019-07-07 PROCEDURE — 74177 CT ABD & PELVIS W/CONTRAST: CPT

## 2019-07-07 RX ADMIN — IOPAMIDOL 98 ML: 612 INJECTION, SOLUTION INTRAVENOUS at 12:40
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
## 2019-09-05 ENCOUNTER — HOSPITAL ENCOUNTER (OUTPATIENT)
Dept: CT IMAGING | Age: 49
Discharge: HOME | End: 2019-09-05
Attending: OBSTETRICS & GYNECOLOGY
Payer: COMMERCIAL

## 2019-09-05 DIAGNOSIS — C56.1 MALIGNANT NEOPLASM OF RIGHT OVARY (HCC): ICD-10-CM

## 2019-09-05 PROCEDURE — 74177 CT ABD & PELVIS W/CONTRAST: CPT

## 2019-09-05 PROCEDURE — 74011636320 HC RX REV CODE- 636/320: Performed by: OBSTETRICS & GYNECOLOGY

## 2019-09-05 RX ADMIN — IOPAMIDOL 100 ML: 612 INJECTION, SOLUTION INTRAVENOUS at 13:57

## 2019-12-09 ENCOUNTER — HOSPITAL ENCOUNTER (OUTPATIENT)
Dept: CT IMAGING | Age: 49
Discharge: HOME | End: 2019-12-09
Attending: OBSTETRICS & GYNECOLOGY
Payer: MEDICAID

## 2019-12-09 DIAGNOSIS — C56.1 OVARIAN CA, RIGHT (HCC): ICD-10-CM

## 2019-12-09 LAB — CREAT UR-MCNC: 1.1 MG/DL (ref 0.6–1.3)

## 2019-12-09 PROCEDURE — 82565 ASSAY OF CREATININE: CPT

## 2019-12-09 PROCEDURE — 74011636320 HC RX REV CODE- 636/320: Performed by: OBSTETRICS & GYNECOLOGY

## 2019-12-09 PROCEDURE — 74177 CT ABD & PELVIS W/CONTRAST: CPT

## 2019-12-09 RX ADMIN — IOPAMIDOL 100 ML: 612 INJECTION, SOLUTION INTRAVENOUS at 08:33

## 2020-02-07 LAB — CREATININE, EXTERNAL: 1.03

## 2020-02-26 ENCOUNTER — HOSPITAL ENCOUNTER (OUTPATIENT)
Dept: MAMMOGRAPHY | Age: 50
Discharge: HOME | End: 2020-02-26
Attending: OBSTETRICS & GYNECOLOGY
Payer: MEDICAID

## 2020-02-26 ENCOUNTER — HOSPITAL ENCOUNTER (OUTPATIENT)
Dept: ULTRASOUND IMAGING | Age: 50
Discharge: HOME | End: 2020-02-26
Attending: OBSTETRICS & GYNECOLOGY
Payer: MEDICAID

## 2020-02-26 DIAGNOSIS — C56.1 MALIGNANT NEOPLASM OF RIGHT OVARY (HCC): ICD-10-CM

## 2020-02-26 DIAGNOSIS — Z09 FOLLOW-UP EXAM: ICD-10-CM

## 2020-02-26 DIAGNOSIS — R92.1 BREAST CALCIFICATIONS: ICD-10-CM

## 2020-02-26 DIAGNOSIS — R92.8 ABNORMAL MAMMOGRAM: ICD-10-CM

## 2020-02-26 PROCEDURE — 77062 BREAST TOMOSYNTHESIS BI: CPT

## 2020-03-12 ENCOUNTER — HOSPITAL ENCOUNTER (OUTPATIENT)
Dept: CT IMAGING | Age: 50
Discharge: HOME | End: 2020-03-12
Attending: NURSE PRACTITIONER
Payer: MEDICAID

## 2020-03-12 DIAGNOSIS — C56.1 MALIGNANT NEOPLASM OF RIGHT OVARY (HCC): ICD-10-CM

## 2020-03-12 LAB — CREAT UR-MCNC: 1.5 MG/DL (ref 0.6–1.3)

## 2020-03-12 PROCEDURE — 82565 ASSAY OF CREATININE: CPT

## 2020-03-12 PROCEDURE — 74177 CT ABD & PELVIS W/CONTRAST: CPT

## 2020-03-12 PROCEDURE — 74011636320 HC RX REV CODE- 636/320: Performed by: NURSE PRACTITIONER

## 2020-03-12 RX ADMIN — IOPAMIDOL 60 ML: 612 INJECTION, SOLUTION INTRAVENOUS at 20:42
